# Patient Record
Sex: MALE | Race: WHITE | NOT HISPANIC OR LATINO | Employment: OTHER | URBAN - METROPOLITAN AREA
[De-identification: names, ages, dates, MRNs, and addresses within clinical notes are randomized per-mention and may not be internally consistent; named-entity substitution may affect disease eponyms.]

---

## 2023-08-05 ENCOUNTER — HOSPITAL ENCOUNTER (INPATIENT)
Facility: HOSPITAL | Age: 78
LOS: 2 days | Discharge: HOME | DRG: 193 | End: 2023-08-09
Attending: EMERGENCY MEDICINE | Admitting: INTERNAL MEDICINE
Payer: MEDICARE

## 2023-08-05 DIAGNOSIS — R07.9 CHEST PAIN: ICD-10-CM

## 2023-08-05 DIAGNOSIS — R42 DIZZINESS: ICD-10-CM

## 2023-08-05 DIAGNOSIS — J96.01 ACUTE HYPOXEMIC RESPIRATORY FAILURE: ICD-10-CM

## 2023-08-05 DIAGNOSIS — J18.9 PNEUMONIA OF LEFT LOWER LOBE DUE TO INFECTIOUS ORGANISM: Primary | ICD-10-CM

## 2023-08-05 PROBLEM — I10 HTN (HYPERTENSION): Status: ACTIVE | Noted: 2023-08-05

## 2023-08-05 PROBLEM — R74.01 TRANSAMINITIS: Status: ACTIVE | Noted: 2023-08-05

## 2023-08-05 PROBLEM — C61 PROSTATE CANCER: Status: ACTIVE | Noted: 2018-10-03

## 2023-08-05 PROBLEM — E78.2 MIXED HYPERLIPIDEMIA: Status: ACTIVE | Noted: 2023-08-05

## 2023-08-05 LAB
ALBUMIN SERPL BCP-MCNC: 2.9 G/DL (ref 3.5–5.2)
ALLENS TEST: ABNORMAL
ALP SERPL-CCNC: 78 U/L (ref 55–135)
ALT SERPL W/O P-5'-P-CCNC: 265 U/L (ref 10–44)
ANION GAP SERPL CALC-SCNC: 11 MMOL/L (ref 8–16)
AST SERPL-CCNC: 214 U/L (ref 10–40)
BACTERIA #/AREA URNS HPF: NORMAL /HPF
BASOPHILS # BLD AUTO: 0.03 K/UL (ref 0–0.2)
BASOPHILS NFR BLD: 0.2 % (ref 0–1.9)
BILIRUB SERPL-MCNC: 0.6 MG/DL (ref 0.1–1)
BILIRUB UR QL STRIP: NEGATIVE
BNP SERPL-MCNC: 41 PG/ML (ref 0–99)
BUN SERPL-MCNC: 25 MG/DL (ref 8–23)
CALCIUM SERPL-MCNC: 8.8 MG/DL (ref 8.7–10.5)
CHLORIDE SERPL-SCNC: 100 MMOL/L (ref 95–110)
CLARITY UR: CLEAR
CO2 SERPL-SCNC: 23 MMOL/L (ref 23–29)
COLOR UR: YELLOW
CREAT SERPL-MCNC: 1.3 MG/DL (ref 0.5–1.4)
DELSYS: ABNORMAL
DIFFERENTIAL METHOD: ABNORMAL
EOSINOPHIL # BLD AUTO: 0 K/UL (ref 0–0.5)
EOSINOPHIL NFR BLD: 0.1 % (ref 0–8)
ERYTHROCYTE [DISTWIDTH] IN BLOOD BY AUTOMATED COUNT: 12.9 % (ref 11.5–14.5)
EST. GFR  (NO RACE VARIABLE): 57 ML/MIN/1.73 M^2
GLUCOSE SERPL-MCNC: 128 MG/DL (ref 70–110)
GLUCOSE UR QL STRIP: NEGATIVE
HAV IGM SERPL QL IA: NORMAL
HBV CORE IGM SERPL QL IA: NORMAL
HBV SURFACE AG SERPL QL IA: NORMAL
HCO3 UR-SCNC: 20.3 MMOL/L (ref 24–28)
HCT VFR BLD AUTO: 38 % (ref 40–54)
HCV AB SERPL QL IA: NORMAL
HGB BLD-MCNC: 13.1 G/DL (ref 14–18)
HGB UR QL STRIP: ABNORMAL
HYALINE CASTS #/AREA URNS LPF: 0 /LPF
IMM GRANULOCYTES # BLD AUTO: 0.19 K/UL (ref 0–0.04)
IMM GRANULOCYTES NFR BLD AUTO: 1.3 % (ref 0–0.5)
INFLUENZA A, MOLECULAR: NEGATIVE
INFLUENZA B, MOLECULAR: NEGATIVE
KETONES UR QL STRIP: NEGATIVE
LACTATE SERPL-SCNC: 1.6 MMOL/L (ref 0.5–2.2)
LEUKOCYTE ESTERASE UR QL STRIP: NEGATIVE
LYMPHOCYTES # BLD AUTO: 0.6 K/UL (ref 1–4.8)
LYMPHOCYTES NFR BLD: 3.9 % (ref 18–48)
MCH RBC QN AUTO: 28.9 PG (ref 27–31)
MCHC RBC AUTO-ENTMCNC: 34.5 G/DL (ref 32–36)
MCV RBC AUTO: 84 FL (ref 82–98)
MICROSCOPIC COMMENT: NORMAL
MONOCYTES # BLD AUTO: 1.5 K/UL (ref 0.3–1)
MONOCYTES NFR BLD: 9.9 % (ref 4–15)
NEUTROPHILS # BLD AUTO: 12.9 K/UL (ref 1.8–7.7)
NEUTROPHILS NFR BLD: 84.6 % (ref 38–73)
NITRITE UR QL STRIP: NEGATIVE
NRBC BLD-RTO: 0 /100 WBC
PCO2 BLDA: 23.7 MMHG (ref 35–45)
PH SMN: 7.54 [PH] (ref 7.35–7.45)
PH UR STRIP: 6 [PH] (ref 5–8)
PLATELET # BLD AUTO: 198 K/UL (ref 150–450)
PMV BLD AUTO: 9.8 FL (ref 9.2–12.9)
PO2 BLDA: 51 MMHG (ref 80–100)
POC BE: -2 MMOL/L
POC SATURATED O2: 91 % (ref 95–100)
POTASSIUM SERPL-SCNC: 4 MMOL/L (ref 3.5–5.1)
PROCALCITONIN SERPL IA-MCNC: 0.22 NG/ML
PROT SERPL-MCNC: 7.2 G/DL (ref 6–8.4)
PROT UR QL STRIP: ABNORMAL
RBC # BLD AUTO: 4.53 M/UL (ref 4.6–6.2)
RBC #/AREA URNS HPF: 0 /HPF (ref 0–4)
SAMPLE: ABNORMAL
SARS-COV-2 RDRP RESP QL NAA+PROBE: NEGATIVE
SITE: ABNORMAL
SODIUM SERPL-SCNC: 134 MMOL/L (ref 136–145)
SP GR UR STRIP: 1.03 (ref 1–1.03)
SPECIMEN SOURCE: NORMAL
TROPONIN I SERPL DL<=0.01 NG/ML-MCNC: 0.01 NG/ML (ref 0–0.03)
TROPONIN I SERPL DL<=0.01 NG/ML-MCNC: 0.01 NG/ML (ref 0–0.03)
URN SPEC COLLECT METH UR: ABNORMAL
UROBILINOGEN UR STRIP-ACNC: ABNORMAL EU/DL
WBC # BLD AUTO: 15.2 K/UL (ref 3.9–12.7)
WBC #/AREA URNS HPF: 2 /HPF (ref 0–5)

## 2023-08-05 PROCEDURE — 25000003 PHARM REV CODE 250: Performed by: EMERGENCY MEDICINE

## 2023-08-05 PROCEDURE — 63600175 PHARM REV CODE 636 W HCPCS: Performed by: NURSE PRACTITIONER

## 2023-08-05 PROCEDURE — 36600 WITHDRAWAL OF ARTERIAL BLOOD: CPT

## 2023-08-05 PROCEDURE — 93005 ELECTROCARDIOGRAM TRACING: CPT

## 2023-08-05 PROCEDURE — 80074 ACUTE HEPATITIS PANEL: CPT | Performed by: INTERNAL MEDICINE

## 2023-08-05 PROCEDURE — 99900035 HC TECH TIME PER 15 MIN (STAT)

## 2023-08-05 PROCEDURE — 84484 ASSAY OF TROPONIN QUANT: CPT | Performed by: EMERGENCY MEDICINE

## 2023-08-05 PROCEDURE — 27000221 HC OXYGEN, UP TO 24 HOURS

## 2023-08-05 PROCEDURE — 97161 PT EVAL LOW COMPLEX 20 MIN: CPT

## 2023-08-05 PROCEDURE — 25500020 PHARM REV CODE 255: Performed by: EMERGENCY MEDICINE

## 2023-08-05 PROCEDURE — 84145 PROCALCITONIN (PCT): CPT | Performed by: INTERNAL MEDICINE

## 2023-08-05 PROCEDURE — 82803 BLOOD GASES ANY COMBINATION: CPT

## 2023-08-05 PROCEDURE — 83880 ASSAY OF NATRIURETIC PEPTIDE: CPT | Performed by: EMERGENCY MEDICINE

## 2023-08-05 PROCEDURE — 97166 OT EVAL MOD COMPLEX 45 MIN: CPT

## 2023-08-05 PROCEDURE — 97530 THERAPEUTIC ACTIVITIES: CPT

## 2023-08-05 PROCEDURE — 87502 INFLUENZA DNA AMP PROBE: CPT | Performed by: EMERGENCY MEDICINE

## 2023-08-05 PROCEDURE — 25000003 PHARM REV CODE 250: Performed by: NURSE PRACTITIONER

## 2023-08-05 PROCEDURE — 93010 ELECTROCARDIOGRAM REPORT: CPT | Mod: ,,, | Performed by: INTERNAL MEDICINE

## 2023-08-05 PROCEDURE — 87040 BLOOD CULTURE FOR BACTERIA: CPT | Performed by: EMERGENCY MEDICINE

## 2023-08-05 PROCEDURE — 96366 THER/PROPH/DIAG IV INF ADDON: CPT

## 2023-08-05 PROCEDURE — 96372 THER/PROPH/DIAG INJ SC/IM: CPT | Performed by: NURSE PRACTITIONER

## 2023-08-05 PROCEDURE — U0002 COVID-19 LAB TEST NON-CDC: HCPCS | Performed by: EMERGENCY MEDICINE

## 2023-08-05 PROCEDURE — G0378 HOSPITAL OBSERVATION PER HR: HCPCS

## 2023-08-05 PROCEDURE — 96361 HYDRATE IV INFUSION ADD-ON: CPT

## 2023-08-05 PROCEDURE — 94761 N-INVAS EAR/PLS OXIMETRY MLT: CPT

## 2023-08-05 PROCEDURE — 93010 EKG 12-LEAD: ICD-10-PCS | Mod: ,,, | Performed by: INTERNAL MEDICINE

## 2023-08-05 PROCEDURE — 63600175 PHARM REV CODE 636 W HCPCS: Performed by: EMERGENCY MEDICINE

## 2023-08-05 PROCEDURE — 85025 COMPLETE CBC W/AUTO DIFF WBC: CPT | Performed by: EMERGENCY MEDICINE

## 2023-08-05 PROCEDURE — 99285 EMERGENCY DEPT VISIT HI MDM: CPT | Mod: 25

## 2023-08-05 PROCEDURE — 80053 COMPREHEN METABOLIC PANEL: CPT | Performed by: EMERGENCY MEDICINE

## 2023-08-05 PROCEDURE — 83605 ASSAY OF LACTIC ACID: CPT | Performed by: EMERGENCY MEDICINE

## 2023-08-05 PROCEDURE — 81000 URINALYSIS NONAUTO W/SCOPE: CPT | Performed by: EMERGENCY MEDICINE

## 2023-08-05 PROCEDURE — 96365 THER/PROPH/DIAG IV INF INIT: CPT | Mod: 59

## 2023-08-05 RX ORDER — PREDNISONE 20 MG/1
40 TABLET ORAL DAILY
Status: ON HOLD | COMMUNITY
Start: 2023-08-03 | End: 2023-08-09 | Stop reason: HOSPADM

## 2023-08-05 RX ORDER — IBUPROFEN 200 MG
24 TABLET ORAL
Status: DISCONTINUED | OUTPATIENT
Start: 2023-08-05 | End: 2023-08-09 | Stop reason: HOSPADM

## 2023-08-05 RX ORDER — ONDANSETRON 2 MG/ML
4 INJECTION INTRAMUSCULAR; INTRAVENOUS EVERY 8 HOURS PRN
Status: DISCONTINUED | OUTPATIENT
Start: 2023-08-05 | End: 2023-08-09 | Stop reason: HOSPADM

## 2023-08-05 RX ORDER — IBUPROFEN 200 MG
16 TABLET ORAL
Status: DISCONTINUED | OUTPATIENT
Start: 2023-08-05 | End: 2023-08-09 | Stop reason: HOSPADM

## 2023-08-05 RX ORDER — DEXBROMPHENIRAMINE MALEATE, PHENYLEPHRINE HYDROCHLORIDE 2; 7.5 MG/1; MG/1
1 TABLET ORAL 3 TIMES DAILY
COMMUNITY
Start: 2023-08-03

## 2023-08-05 RX ORDER — GLUCAGON 1 MG
1 KIT INJECTION
Status: DISCONTINUED | OUTPATIENT
Start: 2023-08-05 | End: 2023-08-09 | Stop reason: HOSPADM

## 2023-08-05 RX ORDER — ROSUVASTATIN CALCIUM 5 MG/1
5 TABLET, COATED ORAL DAILY
COMMUNITY
Start: 2023-06-12

## 2023-08-05 RX ORDER — ENOXAPARIN SODIUM 100 MG/ML
40 INJECTION SUBCUTANEOUS EVERY 24 HOURS
Status: DISCONTINUED | OUTPATIENT
Start: 2023-08-05 | End: 2023-08-09 | Stop reason: HOSPADM

## 2023-08-05 RX ORDER — IPRATROPIUM BROMIDE AND ALBUTEROL SULFATE 2.5; .5 MG/3ML; MG/3ML
3 SOLUTION RESPIRATORY (INHALATION) EVERY 4 HOURS PRN
Status: DISCONTINUED | OUTPATIENT
Start: 2023-08-05 | End: 2023-08-09 | Stop reason: HOSPADM

## 2023-08-05 RX ORDER — ACETAMINOPHEN 500 MG
1000 TABLET ORAL
Status: COMPLETED | OUTPATIENT
Start: 2023-08-05 | End: 2023-08-05

## 2023-08-05 RX ORDER — HYDROCODONE BITARTRATE AND ACETAMINOPHEN 5; 325 MG/1; MG/1
1 TABLET ORAL EVERY 6 HOURS PRN
Status: DISCONTINUED | OUTPATIENT
Start: 2023-08-05 | End: 2023-08-09 | Stop reason: HOSPADM

## 2023-08-05 RX ORDER — NAPROXEN SODIUM 220 MG/1
81 TABLET, FILM COATED ORAL DAILY
Status: DISCONTINUED | OUTPATIENT
Start: 2023-08-06 | End: 2023-08-09 | Stop reason: HOSPADM

## 2023-08-05 RX ORDER — AMOXICILLIN 875 MG/1
875 TABLET, FILM COATED ORAL EVERY 12 HOURS
Status: ON HOLD | COMMUNITY
Start: 2023-08-03 | End: 2023-08-09 | Stop reason: HOSPADM

## 2023-08-05 RX ORDER — ATORVASTATIN CALCIUM 10 MG/1
20 TABLET, FILM COATED ORAL NIGHTLY
Status: DISCONTINUED | OUTPATIENT
Start: 2023-08-05 | End: 2023-08-05

## 2023-08-05 RX ORDER — NALOXONE HCL 0.4 MG/ML
0.02 VIAL (ML) INJECTION
Status: DISCONTINUED | OUTPATIENT
Start: 2023-08-05 | End: 2023-08-09 | Stop reason: HOSPADM

## 2023-08-05 RX ORDER — MECLIZINE HYDROCHLORIDE 25 MG/1
25 TABLET ORAL 3 TIMES DAILY PRN
Status: DISCONTINUED | OUTPATIENT
Start: 2023-08-05 | End: 2023-08-09 | Stop reason: HOSPADM

## 2023-08-05 RX ORDER — NAPROXEN SODIUM 220 MG/1
81 TABLET, FILM COATED ORAL DAILY
COMMUNITY

## 2023-08-05 RX ORDER — SODIUM CHLORIDE, SODIUM LACTATE, POTASSIUM CHLORIDE, CALCIUM CHLORIDE 600; 310; 30; 20 MG/100ML; MG/100ML; MG/100ML; MG/100ML
INJECTION, SOLUTION INTRAVENOUS CONTINUOUS
Status: DISCONTINUED | OUTPATIENT
Start: 2023-08-05 | End: 2023-08-08

## 2023-08-05 RX ORDER — AMLODIPINE AND BENAZEPRIL HYDROCHLORIDE 10; 20 MG/1; MG/1
1 CAPSULE ORAL DAILY
COMMUNITY
Start: 2023-06-12

## 2023-08-05 RX ORDER — SODIUM CHLORIDE 0.9 % (FLUSH) 0.9 %
10 SYRINGE (ML) INJECTION EVERY 12 HOURS PRN
Status: DISCONTINUED | OUTPATIENT
Start: 2023-08-05 | End: 2023-08-09 | Stop reason: HOSPADM

## 2023-08-05 RX ORDER — ACETAMINOPHEN 325 MG/1
650 TABLET ORAL EVERY 4 HOURS PRN
Status: DISCONTINUED | OUTPATIENT
Start: 2023-08-05 | End: 2023-08-09 | Stop reason: HOSPADM

## 2023-08-05 RX ORDER — TALC
6 POWDER (GRAM) TOPICAL NIGHTLY PRN
Status: DISCONTINUED | OUTPATIENT
Start: 2023-08-05 | End: 2023-08-09 | Stop reason: HOSPADM

## 2023-08-05 RX ORDER — ACETAMINOPHEN 325 MG/1
650 TABLET ORAL EVERY 6 HOURS PRN
Status: DISCONTINUED | OUTPATIENT
Start: 2023-08-05 | End: 2023-08-09 | Stop reason: HOSPADM

## 2023-08-05 RX ADMIN — ACETAMINOPHEN 650 MG: 325 TABLET ORAL at 05:08

## 2023-08-05 RX ADMIN — SODIUM CHLORIDE, POTASSIUM CHLORIDE, SODIUM LACTATE AND CALCIUM CHLORIDE 1000 ML: 600; 310; 30; 20 INJECTION, SOLUTION INTRAVENOUS at 08:08

## 2023-08-05 RX ADMIN — ACETAMINOPHEN 1000 MG: 500 TABLET ORAL at 07:08

## 2023-08-05 RX ADMIN — SODIUM CHLORIDE 500 ML: 9 INJECTION, SOLUTION INTRAVENOUS at 04:08

## 2023-08-05 RX ADMIN — SODIUM CHLORIDE, POTASSIUM CHLORIDE, SODIUM LACTATE AND CALCIUM CHLORIDE: 600; 310; 30; 20 INJECTION, SOLUTION INTRAVENOUS at 12:08

## 2023-08-05 RX ADMIN — ENOXAPARIN SODIUM 40 MG: 40 INJECTION SUBCUTANEOUS at 04:08

## 2023-08-05 RX ADMIN — CEFTRIAXONE 1 G: 1 INJECTION, POWDER, FOR SOLUTION INTRAMUSCULAR; INTRAVENOUS at 09:08

## 2023-08-05 RX ADMIN — IOHEXOL 100 ML: 350 INJECTION, SOLUTION INTRAVENOUS at 07:08

## 2023-08-05 RX ADMIN — VANCOMYCIN HYDROCHLORIDE 2000 MG: 10 INJECTION, POWDER, LYOPHILIZED, FOR SOLUTION INTRAVENOUS at 09:08

## 2023-08-05 NOTE — H&P
[facility] - Emergency Dept.  [facility] Medicine  History & Physical    Patient Name: Shelby Nunez Jr.  MRN: [id number]  Patient Class: OP- Observation  Admission Date: 8/5/2023  Attending Physician: Carl Osborne,*   Primary Care Provider: Kassandra, Primary Doctor         Patient information was obtained from patient, past medical records and ER records.     Subjective:     Principal Problem:Acute hypoxemic respiratory failure    Chief Complaint:   Chief Complaint   Patient presents with    Altered Mental Status     Pt became confused tonight, family called EMS. Dx with vertigo and sinus infection yesterday. Unsteady on feet. Tachypneic        HPI: Mr. Enrique Nichols is a 77 y.o. male patient with a PMHx of a HTN, HLD, prostate cancer s/p prostatectomy who presents to the Emergency Department for evaluation of altered mental status and dizziness which started last night. Patient started feeling unwell on Wednesday night and had a fever aroudn 101 that night.  [facility] was diagnosed with vertigo and sinus infection, he was started on steroids and amoxicillin.  Last night patients wife stated that he had another fever and was confused and dizzy and fell while coming back from the bathroom, did not hit head or lose consciousness.  He has been taking IBP around the clock for his fevers.  He denies any CP, N/V/D.  In the ED, tmax 103.1, o2 sats 86 on room air, placed on 2 liters.  Lab work notable for WBC 15, , .  UA with no signs of infection.  FLU and COVID negative.  CT head negative for any acute intracranial processes.  CTA chest negative for PE, Showed Moderate size ground-glass and airspace infiltrate left lower lobe.  Patient will be admitted to observation for vertigo, pna, hypoxia.      Code status Full  Surrogate Decision Maker wife Tiffanie      History reviewed. No pertinent past medical history.    Past Surgical History:   Procedure Laterality Date     PROSTATECTOMY  2019       Review of patient's allergies indicates:  No Known Allergies    No current facility-administered medications on file prior to encounter.     Current Outpatient Medications on File Prior to Encounter   Medication Sig    ALAHIST PE 2-7.5 mg Tab Take 1 tablet by mouth 3 (three) times daily.    amlodipine-benazepril 10-20mg (LOTREL) 10-20 mg per capsule Take 1 capsule by mouth.    amoxicillin (AMOXIL) 875 MG tablet Take 875 mg by mouth every 12 (twelve) hours.    aspirin 81 MG Chew Take 81 mg by mouth once daily.    predniSONE (DELTASONE) 20 MG tablet Take 40 mg by mouth.    rosuvastatin (CRESTOR) 5 MG tablet Take 5 mg by mouth.     Family History    None       Tobacco Use    Smoking status: Former     Current packs/day: 0.00     Types: Cigarettes    Smokeless tobacco: Never   Substance and Sexual Activity    Alcohol use: Not Currently     Alcohol/week: 4.0 standard drinks of alcohol     Types: 4 Cans of beer per week    Drug use: Never    Sexual activity: Not on file     Review of Systems   Constitutional:  Positive for appetite change, fatigue and fever.   Neurological:  Positive for dizziness and weakness.   Psychiatric/Behavioral:  Positive for confusion.      Objective:     Vital Signs (Most Recent):  Temp: 100 °F (37.8 °C) (08/05/23 0909)  Pulse: 79 (08/05/23 0909)  Resp: (!) 22 (08/05/23 0909)  BP: (!) 122/54 (08/05/23 0909)  SpO2: 95 % (08/05/23 0909) Vital Signs (24h Range):  Temp:  [99.5 °F (37.5 °C)-103.1 °F (39.5 °C)] 100 °F (37.8 °C)  Pulse:  [79-95] 79  Resp:  [20-29] 22  SpO2:  [92 %-96 %] 95 %  BP: (122-159)/() 122/54     Weight: 103 kg (227 lb 1.2 oz)  Body mass index is 30.8 kg/m².     Physical Exam  Constitutional:       Appearance: He is ill-appearing.   Cardiovascular:      Rate and Rhythm: Normal rate and regular rhythm.      Pulses: Normal pulses.      Heart sounds: Normal heart sounds.   Pulmonary:      Effort: Pulmonary effort is normal.      Comments:  Diminished breath sounds all lobes, on 2 liters nasal cannula   Abdominal:      General: Bowel sounds are normal. There is no distension.      Palpations: Abdomen is soft.      Tenderness: There is no abdominal tenderness.   Musculoskeletal:         General: No swelling. Normal range of motion.   Skin:     General: Skin is warm and dry.   Neurological:      General: No focal deficit present.      Mental Status: He is alert and oriented to person, place, and time.        Significant Labs: All pertinent labs within the past 24 hours have been reviewed.    Significant Imaging: I have reviewed all pertinent imaging results/findings within the past 24 hours.    Assessment/Plan:     * Acute hypoxemic respiratory failure  Patient with Hypoxic Respiratory failure which is Acute.  he is not on home oxygen. Supplemental oxygen was provided and noted-    Signs/symptoms of respiratory failure include- tachypnea. Contributing diagnoses includes - Pneumonia Labs and images were reviewed. Patient Has recent ABG, which has been reviewed. Will treat underlying causes and adjust management of respiratory failure as follows    Ct chest showed: Moderate size ground-glass and airspace infiltrate left lower lobe  Continue iv abx  Wean o2 as tolerated  Duo nebs as needed    Vertigo  Pt/ot eval  meclizine as needed  Fall precautions        Mixed hyperlipidemia  Holding statin d/t elevated ALT/AST      HTN (hypertension)  bp low/normotensive, holding home amlodipine/benazpril at this time      Transaminitis  Likely d/t recent use of ibproufen  Check hep panel  Trend, if not improving consider ab ultrasound        VTE Risk Mitigation (From admission, onward)         Ordered     enoxaparin injection 40 mg  Daily         08/05/23 0920     IP VTE HIGH RISK PATIENT  Once         08/05/23 0920     Place sequential compression device  Until discontinued         08/05/23 0920                     On 08/05/2023, patient should be placed in hospital  observation services under my care in collaboration with DR. Choe.      Manasa Dela Cruz NP  Department of Hospital Medicine  [facility] - Emergency Dept.

## 2023-08-05 NOTE — ASSESSMENT & PLAN NOTE
Likely d/t recent use of ibproufen  Check hep panel  Trend, if not improving consider ab ultrasound

## 2023-08-05 NOTE — PHARMACY MED REC
"Admission Medication History     The home medication history was taken by Nelson Loving.    You may go to "Admission" then "Reconcile Home Medications" tabs to review and/or act upon these items.     The home medication list has been updated by the Pharmacy department.   Please read ALL comments highlighted in yellow.   Please address this information as you see fit.    Feel free to contact us if you have any questions or require assistance.      Medications listed below were obtained from: Analytic software- [employer] and Medical records  (Not in a hospital admission)        Nelson Loving  [id number]    Current Outpatient Medications on File Prior to Encounter   Medication Sig Dispense Refill Last Dose    ALAHIST PE 2-7.5 mg Tab Take 1 tablet by mouth 3 (three) times daily.   8/4/2023    amlodipine-benazepril 10-20mg (LOTREL) 10-20 mg per capsule Take 1 capsule by mouth.   8/4/2023    amoxicillin (AMOXIL) 875 MG tablet Take 875 mg by mouth every 12 (twelve) hours.   8/4/2023    aspirin 81 MG Chew Take 81 mg by mouth once daily.   8/4/2023    predniSONE (DELTASONE) 20 MG tablet Take 40 mg by mouth.   8/4/2023    rosuvastatin (CRESTOR) 5 MG tablet Take 5 mg by mouth.   8/4/2023                         .          "

## 2023-08-05 NOTE — ASSESSMENT & PLAN NOTE
Patient with Hypoxic Respiratory failure which is Acute.  he is not on home oxygen. Supplemental oxygen was provided and noted-    Signs/symptoms of respiratory failure include- tachypnea. Contributing diagnoses includes - Pneumonia Labs and images were reviewed. Patient Has recent ABG, which has been reviewed. Will treat underlying causes and adjust management of respiratory failure as follows    Ct chest showed: Moderate size ground-glass and airspace infiltrate left lower lobe  Continue iv abx  Wean o2 as tolerated  Duo nebs as needed

## 2023-08-05 NOTE — ED PROVIDER NOTES
SCRIBE #1 NOTE: I, Delldemian Lao, am scribing for, and in the presence of, Artur Luis Jr., MD. I have scribed the HPI, YANDEL, PEx.     SCRIBE #2 NOTE: I, My Presley, am scribing for, and in the presence of,  Fahad Boyd MD. I have scribed the remaining portions of the note not scribed by Scribe #1.      History     Chief Complaint   Patient presents with    Altered Mental Status     Pt became confused tonight, family called EMS. Dx with vertigo and sinus infection yesterday. Unsteady on feet. Tachypneic     Review of patient's allergies indicates:  No Known Allergies      History of Present Illness     HPI    8/5/2023, 4:56 AM  History obtained from the patient and wife      History of Present Illness: Shelby Nunez Jr. is a 77 y.o. male patient with a PMHx of a prostate removal who presents to the Emergency Department for evaluation of altered mental status which onset last night. Pt's wife states pt has had a fever (Tmax 102.2 F) and fell onto the ground after going to the bathroom. Pt has been taking ibuprofen to alleviate the fever. Symptoms are constant and moderate in severity. No mitigating or exacerbating factors reported. Associated sxs include nausea, hiccups, dizziness, and tachypnea. Patient denies any V/D, chest pain, syncope, cough, congestion, and all other sxs at this time. No prior Tx. Pt was dx with vertigo and a sinus infection yesterday. No further complaints or concerns at this time.       Arrival mode: Ambulance Service    PCP: Kassandra, Primary Doctor        Past Medical History:  History reviewed. No pertinent past medical history.    Past Surgical History:  Past Surgical History:   Procedure Laterality Date    PROSTATECTOMY  2019         Family History:  History reviewed. No pertinent family history.    Social History:  Social History     Tobacco Use    Smoking status: Former     Current packs/day: 0.00     Types: Cigarettes    Smokeless tobacco: Never   Substance and Sexual Activity     Alcohol use: Not Currently     Alcohol/week: 4.0 standard drinks of alcohol     Types: 4 Cans of beer per week    Drug use: Never    Sexual activity: Not on file        Review of Systems     Review of Systems   Constitutional:  Positive for fever.   HENT:  Negative for congestion and sore throat.    Respiratory:  Negative for cough and shortness of breath.         (+) Hiccups  (+) Tachypnea   Cardiovascular:  Negative for chest pain.   Gastrointestinal:  Positive for nausea. Negative for diarrhea and vomiting.   Genitourinary:  Negative for dysuria.   Musculoskeletal:  Negative for back pain.   Skin:  Negative for rash.   Neurological:  Positive for dizziness. Negative for syncope and weakness.   Hematological:  Does not bruise/bleed easily.   Psychiatric/Behavioral:          (+) Altered Mental Status   All other systems reviewed and are negative.       Physical Exam     Initial Vitals [08/05/23 0233]   BP Pulse Resp Temp SpO2   (!) 150/72 86 20 99.5 °F (37.5 °C) 95 %      MAP       --          Physical Exam  Nursing Notes and Vital Signs Reviewed.  Constitutional: Patient is in no acute distress. Well-developed and well-nourished.  Head: Atraumatic. Normocephalic.  Eyes: PERRL. EOM intact. Conjunctivae are not pale. No scleral icterus.  ENT: Mucous membranes are moist. Oropharynx is clear and symmetric.    Neck: Supple. Full ROM. No lymphadenopathy.  Cardiovascular: Regular rate. Regular rhythm. No murmurs, rubs, or gallops. Distal pulses are 2+ and symmetric.  Pulmonary/Chest: No respiratory distress. Clear to auscultation bilaterally. No wheezing or rales.  Abdominal: Soft and non-distended.  There is no tenderness.  No rebound, guarding, or rigidity. Good bowel sounds.  Genitourinary: No CVA tenderness  Musculoskeletal: Moves all extremities. No obvious deformities. No edema. No calf tenderness.  Skin: Warm and dry.  Neurological:  Alert, awake, and appropriate.  Normal speech.  No acute focal neurological  deficits are appreciated.  Psychiatric: Normal affect. Good eye contact. Appropriate in content.     ED Course   Critical Care    Date/Time: 8/5/2023 8:16 AM    Performed by: Fahad Boyd MD  Authorized by: Fahad Boyd MD  Direct patient critical care time: 7 minutes  Additional history critical care time: 6 minutes  Ordering / reviewing critical care time: 9 minutes  Documentation critical care time: 3 minutes  Consulting other physicians critical care time: 5 minutes  Consult with family critical care time: 4 minutes  Other critical care time: 3 minutes  Total critical care time (exclusive of procedural time) : 37 minutes  Critical care time was exclusive of separately billable procedures and treating other patients and teaching time.  Critical care was necessary to treat or prevent imminent or life-threatening deterioration of the following conditions: sepsis.  Critical care was time spent personally by me on the following activities: blood draw for specimens, development of treatment plan with patient or surrogate, discussions with consultants, interpretation of cardiac output measurements, evaluation of patient's response to treatment, obtaining history from patient or surrogate, examination of patient, pulse oximetry, re-evaluation of patient's condition, review of old charts, ordering and review of radiographic studies, ordering and review of laboratory studies and ordering and performing treatments and interventions.  Subsequent provider of critical care: I assumed direction of critical care for this patient from another provider of my specialty.        ED Vital Signs:  Vitals:    08/05/23 0615 08/05/23 0628 08/05/23 0645 08/05/23 0700   BP:   (!) 159/139 124/60   Pulse: 91  87 87   Resp: (!) 28  (!) 27 (!) 24   Temp:  (!) 103.1 °F (39.5 °C)     TempSrc:  Oral     SpO2: (!) 94%  (!) 94% 96%   Weight:       Height:        08/05/23 0721 08/05/23 0818 08/05/23 0909 08/05/23 1002   BP:   (!) 122/54 (!)  106/55   Pulse:  83 79 75   Resp:  (!) 21 (!) 22 20   Temp:   100 °F (37.8 °C)    TempSrc:   Oral    SpO2:  96% 95% 95%   Weight:       Height: 6' (1.829 m)       08/05/23 1102 08/05/23 1203 08/05/23 1321 08/05/23 1400   BP: 110/60 (!) 114/53  (!) 170/76   Pulse: 79 70 104    Resp: 20 20 (!) 22    Temp:  98.9 °F (37.2 °C) 99.3 °F (37.4 °C)    TempSrc:   Oral    SpO2: 96% 96% (!) 93%    Weight:       Height:        08/05/23 1710 08/05/23 1736 08/05/23 1751   BP:  (!) 133/55    Pulse: 78 89    Resp:  18    Temp:  (!) 103 °F (39.4 °C) (!) 103 °F (39.4 °C)   TempSrc:  Oral    SpO2:  (!) 92%    Weight:      Height:          Abnormal Lab Results:  Labs Reviewed   CBC W/ AUTO DIFFERENTIAL - Abnormal; Notable for the following components:       Result Value    WBC 15.20 (*)     RBC 4.53 (*)     Hemoglobin 13.1 (*)     Hematocrit 38.0 (*)     Immature Granulocytes 1.3 (*)     Gran # (ANC) 12.9 (*)     Immature Grans (Abs) 0.19 (*)     Lymph # 0.6 (*)     Mono # 1.5 (*)     Gran % 84.6 (*)     Lymph % 3.9 (*)     All other components within normal limits   COMPREHENSIVE METABOLIC PANEL - Abnormal; Notable for the following components:    Sodium 134 (*)     Glucose 128 (*)     BUN 25 (*)     Albumin 2.9 (*)      (*)      (*)     eGFR 57 (*)     All other components within normal limits   URINALYSIS, REFLEX TO URINE CULTURE - Abnormal; Notable for the following components:    Protein, UA 1+ (*)     Occult Blood UA 1+ (*)     Urobilinogen, UA 2.0-3.0 (*)     All other components within normal limits    Narrative:     Specimen Source->Urine   ISTAT PROCEDURE - Abnormal; Notable for the following components:    POC PH 7.540 (*)     POC PCO2 23.7 (*)     POC PO2 51 (*)     POC HCO3 20.3 (*)     POC SATURATED O2 91 (*)     All other components within normal limits   INFLUENZA A & B BY MOLECULAR   TROPONIN I   TROPONIN I   B-TYPE NATRIURETIC PEPTIDE   SARS-COV-2 RNA AMPLIFICATION, QUAL   URINALYSIS MICROSCOPIC     Narrative:     Specimen Source->Urine   LACTIC ACID, PLASMA   PROCALCITONIN        All Lab Results:  Results for orders placed or performed during the hospital encounter of 08/05/23   Influenza A & B by Molecular    Specimen: Nasopharyngeal Swab   Result Value Ref Range    Influenza A, Molecular Negative Negative    Influenza B, Molecular Negative Negative    Flu A & B Source Nasal swab    CBC auto differential   Result Value Ref Range    WBC 15.20 (H) 3.90 - 12.70 K/uL    RBC 4.53 (L) 4.60 - 6.20 M/uL    Hemoglobin 13.1 (L) 14.0 - 18.0 g/dL    Hematocrit 38.0 (L) 40.0 - 54.0 %    MCV 84 82 - 98 fL    MCH 28.9 27.0 - 31.0 pg    MCHC 34.5 32.0 - 36.0 g/dL    RDW 12.9 11.5 - 14.5 %    Platelets 198 150 - 450 K/uL    MPV 9.8 9.2 - 12.9 fL    Immature Granulocytes 1.3 (H) 0.0 - 0.5 %    Gran # (ANC) 12.9 (H) 1.8 - 7.7 K/uL    Immature Grans (Abs) 0.19 (H) 0.00 - 0.04 K/uL    Lymph # 0.6 (L) 1.0 - 4.8 K/uL    Mono # 1.5 (H) 0.3 - 1.0 K/uL    Eos # 0.0 0.0 - 0.5 K/uL    Baso # 0.03 0.00 - 0.20 K/uL    nRBC 0 0 /100 WBC    Gran % 84.6 (H) 38.0 - 73.0 %    Lymph % 3.9 (L) 18.0 - 48.0 %    Mono % 9.9 4.0 - 15.0 %    Eosinophil % 0.1 0.0 - 8.0 %    Basophil % 0.2 0.0 - 1.9 %    Differential Method Automated    Comprehensive metabolic panel   Result Value Ref Range    Sodium 134 (L) 136 - 145 mmol/L    Potassium 4.0 3.5 - 5.1 mmol/L    Chloride 100 95 - 110 mmol/L    CO2 23 23 - 29 mmol/L    Glucose 128 (H) 70 - 110 mg/dL    BUN 25 (H) 8 - 23 mg/dL    Creatinine 1.3 0.5 - 1.4 mg/dL    Calcium 8.8 8.7 - 10.5 mg/dL    Total Protein 7.2 6.0 - 8.4 g/dL    Albumin 2.9 (L) 3.5 - 5.2 g/dL    Total Bilirubin 0.6 0.1 - 1.0 mg/dL    Alkaline Phosphatase 78 55 - 135 U/L     (H) 10 - 40 U/L     (H) 10 - 44 U/L    eGFR 57 (A) >60 mL/min/1.73 m^2    Anion Gap 11 8 - 16 mmol/L   Troponin I #1   Result Value Ref Range    Troponin I 0.012 0.000 - 0.026 ng/mL   Troponin I #2   Result Value Ref Range    Troponin I 0.010 0.000 -  0.026 ng/mL   BNP   Result Value Ref Range    BNP 41 0 - 99 pg/mL   COVID-19 Rapid Screening   Result Value Ref Range    SARS-CoV-2 RNA, Amplification, Qual Negative Negative   Urinalysis, Reflex to Urine Culture Urine, Catheterized    Specimen: Urine   Result Value Ref Range    Specimen UA Urine, Catheterized     Color, UA Yellow Yellow, Straw, Lucía    Appearance, UA Clear Clear    pH, UA 6.0 5.0 - 8.0    Specific Gravity, UA 1.030 1.005 - 1.030    Protein, UA 1+ (A) Negative    Glucose, UA Negative Negative    Ketones, UA Negative Negative    Bilirubin (UA) Negative Negative    Occult Blood UA 1+ (A) Negative    Nitrite, UA Negative Negative    Urobilinogen, UA 2.0-3.0 (A) <2.0 EU/dL    Leukocytes, UA Negative Negative   Urinalysis Microscopic   Result Value Ref Range    RBC, UA 0 0 - 4 /hpf    WBC, UA 2 0 - 5 /hpf    Bacteria Rare None-Occ /hpf    Hyaline Casts, UA 0 0-1/lpf /lpf    Microscopic Comment SEE COMMENT    Lactic acid, plasma   Result Value Ref Range    Lactate (Lactic Acid) 1.6 0.5 - 2.2 mmol/L   Procalcitonin   Result Value Ref Range    Procalcitonin 0.22 <0.25 ng/mL   ISTAT PROCEDURE   Result Value Ref Range    POC PH 7.540 (H) 7.35 - 7.45    POC PCO2 23.7 (LL) 35 - 45 mmHg    POC PO2 51 (LL) 80 - 100 mmHg    POC HCO3 20.3 (L) 24 - 28 mmol/L    POC BE -2 -2 to 2 mmol/L    POC SATURATED O2 91 (L) 95 - 100 %    Sample ARTERIAL     Site RR     Allens Test Pass     DelSys Room Air         Imaging Results:  Imaging Results              CTA Chest Non-Coronary (PE Studies) (Final result)  Result time 08/05/23 08:21:25      Final result by Tony Zamudio MD (08/05/23 08:21:25)                   Impression:      Moderate size ground-glass and airspace infiltrate left lower lobe.    Negative for pulmonary embolism.  Negative for aortic dissection.    Mildly prominent nonspecific mediastinal lymph nodes as detailed above.      Electronically signed by: Tony  Lizz  Date:    08/05/2023  Time:    08:21               Narrative:    EXAMINATION:  CTA CHEST NON CORONARY (PE STUDIES)    CLINICAL HISTORY:  Pulmonary embolism (PE) suspected, high prob;    TECHNIQUE:  Low dose axial images, sagittal and coronal reformations were obtained from the thoracic inlet to the lung bases following administration of intravenous contrast.  Contrast timing was optimized to evaluate the pulmonary arteries.  3D reformatted images were performed, reviewed and archived.    COMPARISON:  None    FINDINGS:  Pulmonary vasculature normal.  Negative for pulmonary embolism.    Heart normal in size with severe coronary artery calcification.  Aorta normal in caliber with mild atherosclerotic calcification.  Negative for dissection or aneurysm.  Mildly prominent mediastinal lymph nodes.  Largest is an aortopulmonary window lymph node measuring 1.8 by 1.4 cm.  No axillary adenopathy.  No adenopathy inferior neck.    Mild dependent atelectasis right lung.  Moderate size ground-glass and airspace infiltrate of the left lower lobe.  Left upper lobe clear.    Visualized upper abdomen unremarkable.  Chest wall soft tissues normal.    The visualized upper abdomen demonstrates no abnormalities.    Degenerative changes of the spine.  No suspicious or acute bony abnormality.                                       CT Head Without Contrast (Final result)  Result time 08/05/23 07:14:17      Final result by Tony Zamudio MD (08/05/23 07:14:17)                   Impression:      No acute findings.    Mild chronic ischemic changes deep white matter.      Electronically signed by: Tony Zamudio  Date:    08/05/2023  Time:    07:14               Narrative:    EXAMINATION:  CT HEAD WITHOUT CONTRAST    CLINICAL HISTORY:  Dizziness, persistent/recurrent, cardiac or vascular cause suspected;.    TECHNIQUE:  Low dose axial images were obtained through the head.  Coronal and sagittal reformations were also  performed. Contrast was not administered.    COMPARISON:  None.    FINDINGS:  Midline structures are intact. Ventricles are of normal size and configuration. Mild periventricular low-density changes compatible with mild chronic ischemic microvascular disease.    No intracranial hemorrhage,acute infarct, or suspicious intracranial lesion is identified.    Skull base and calvarium are normal. Incompletely visualized small polyp/retention cyst left maxillary antrum.  Remaining sinuses and mastoid air cells are clear.                                       X-Ray Chest AP Portable (Final result)  Result time 08/05/23 07:29:52      Final result by Tony Zamudio MD (08/05/23 07:29:52)                   Impression:      Discoid atelectasis left lung base.  Otherwise no acute findings.    No significant change since 10/03/2005.      Electronically signed by: Tony Zamudio  Date:    08/05/2023  Time:    07:29               Narrative:    EXAMINATION:  XR CHEST AP PORTABLE    CLINICAL HISTORY:  dizziness;    TECHNIQUE:  Chest x-ray, 1 views    COMPARISON:  10/03/2005    FINDINGS:  Atelectasis at the left lung base.  Otherwise lungs are clear.  Heart size within normal limits.No significant bony findings.                                       The EKG was ordered, reviewed, and independently interpreted by the ED provider.  Interpretation time: 4:58  Rate: 93  BPM  Rhythm: normal sinus rhythm with sinus arrhythmia   Interpretation: Right bundle branch block. No STEMI.           The Emergency Provider reviewed the vital signs and test results, which are outlined above.     ED Discussion     6:00 AM: Dr. Luis transfers care of patient to Dr. Boyd pending results.    8:51 AM: Discussed case with Manasa Dela Cruz NP. Dr. Carl Butts MD ([facility]) agrees with current care and management of pt and accepts admission.   Admitting Service: Hospital Medicine  Admitting Physician: Dr. Henry  Daija Butts  Admit to: Obs med surg     8:51 AM: Re-evaluated pt. I have discussed test results, shared treatment plan, and the need for admission with patient and family at bedside. Pt and family express understanding at this time and agree with all information. All questions answered. Pt and family have no further questions or concerns at this time. Pt is ready for admit.     ED Course as of 08/05/23 1758   Sat Aug 05, 2023   0710 Re-evaluated patient. 3d of lightheadedness, almost passing out, loosing his balace after standing. Y/d at , had fluid behind a TM, started Amox + Prednisone. This AM, was confused, thought his urine was blue, and was too weak to get back into bed. Here in ED, has been noted to be hypoxic, mild confusion per wife at bedside. Elevated WBC. pH elevated, possible over ventilation due to fever and hypoxia. CXR without kaylen opacity. CT pending, Abx started, BCx and Lactate ordered. Family would like admission to hospital.  [BA]      ED Course User Index  [BA] Fahad Boyd MD     Medical Decision Making:   Clinical Tests:   Lab Tests: Ordered and Reviewed  Radiological Study: Ordered and Reviewed  Medical Tests: Ordered and Reviewed           ED Medication(s):  Medications   aspirin chewable tablet 81 mg (has no administration in time range)   sodium chloride 0.9% flush 10 mL (has no administration in time range)   naloxone 0.4 mg/mL injection 0.02 mg (has no administration in time range)   glucose chewable tablet 16 g (has no administration in time range)   glucose chewable tablet 24 g (has no administration in time range)   glucagon (human recombinant) injection 1 mg (has no administration in time range)   enoxaparin injection 40 mg (40 mg Subcutaneous Given 8/5/23 1428)   acetaminophen tablet 650 mg (has no administration in time range)   HYDROcodone-acetaminophen 5-325 mg per tablet 1 tablet (has no administration in time range)   ondansetron injection 4 mg (has no  administration in time range)   melatonin tablet 6 mg (has no administration in time range)   albuterol-ipratropium 2.5 mg-0.5 mg/3 mL nebulizer solution 3 mL (has no administration in time range)   meclizine tablet 25 mg (has no administration in time range)   cefTRIAXone (ROCEPHIN) 2 g in dextrose 5 % in water (D5W) 100 mL IVPB (MB+) (has no administration in time range)   cefTRIAXone (ROCEPHIN) 1 g in dextrose 5 % in water (D5W) 100 mL IVPB (MB+) (1 g Intravenous Not Given 8/5/23 1000)   lactated ringers infusion ( Intravenous Stopped 8/5/23 1302)   acetaminophen tablet 650 mg (650 mg Oral Given 8/5/23 1751)   sodium chloride 0.9% bolus 500 mL 500 mL (0 mLs Intravenous Stopped 8/5/23 0549)   acetaminophen tablet 1,000 mg (1,000 mg Oral Given 8/5/23 0747)   cefTRIAXone (ROCEPHIN) 1 g in dextrose 5 % in water (D5W) 100 mL IVPB (MB+) (0 g Intravenous Stopped 8/5/23 0933)   lactated ringers bolus 1,000 mL (0 mLs Intravenous Stopped 8/5/23 0903)   iohexoL (OMNIPAQUE 350) injection 100 mL (100 mLs Intravenous Given 8/5/23 0737)   vancomycin 2 g in dextrose 5 % 500 mL IVPB (0 mg Intravenous Stopped 8/5/23 1106)       Current Discharge Medication List                  Scribe Attestation:   Scribe #1: I performed the above scribed service and the documentation accurately describes the services I performed. I attest to the accuracy of the note.     Attending:   Physician Attestation Statement for Scribe #1: I, Artur Luis Jr., MD, personally performed the services described in this documentation, as scribed by Dell Lao, in my presence, and it is both accurate and complete.       Scribe Attestation:   Scribe #2: I performed the above scribed service and the documentation accurately describes the services I performed. I attest to the accuracy of the note.    Attending Attestation:           Physician Attestation for Scribe:    Physician Attestation Statement for Scribe #2: I, Fahad Boyd MD, reviewed  documentation, as scribed by My Presley in my presence, and it is both accurate and complete. I also acknowledge and confirm the content of the note done by Scribe #1.           Clinical Impression       ICD-10-CM ICD-9-CM   1. Pneumonia of left lower lobe due to infectious organism  J18.9 486   2. Dizziness  R42 780.4   3. Acute hypoxemic respiratory failure  J96.01 518.81   4. Chest pain  R07.9 786.50       Disposition:   Disposition: Placed in Observation  Condition: Serious         Fahad Boyd MD  08/05/23 9435

## 2023-08-05 NOTE — PT/OT/SLP EVAL
"Occupational Therapy   Evaluation and Discharge Note    Name: Shelby Nunez Jr.  MRN: [id number]  Admitting Diagnosis: Acute hypoxemic respiratory failure  Recent Surgery: * No surgery found *      Recommendations:     Discharge Recommendations:  (defer to PT)  Discharge Equipment Recommendations: none  Barriers to discharge:  None    Assessment:     Shelby Nunez Jr. is a 77 y.o. male with a medical diagnosis of Acute hypoxemic respiratory failure. At this time, patient with + vestibular screening limiting balance and baseline independence.    Plan:     During this hospitalization, patient does not require further acute OT services as PT with goals to address deficits.  Please re-consult if situation changes.    Plan of Care Reviewed with: patient, spouse    Subjective     Chief Complaint: Reported "I am not dizzy at all now."  Patient/Family Comments/goals: increase independence    Occupational Profile:  Living Environment: Patient resides in a single story home with no steps to enter, with his wife.  Previous level of function: Patient was independent with ADLs and community distance ambulation prior to admission.  Roles and Routines: Patient was an active  and worked part time driving for [employer].  Equipment Used at home: none  Assistance upon Discharge: spouse    Pain/Comfort:  Pain Rating 1: 0/10    Objective:     Communicated with: NursePham, prior to session.  Patient found supine with peripheral IV, telemetry, PureWick, blood pressure cuff, pulse ox (continuous), oxygen upon OT entry to room.    General Precautions: Standard, fall  Orthopedic Precautions: N/A  Braces: N/A  Respiratory Status: Nasal cannula, flow 4 L/min     Occupational Performance:    Bed Mobility:    Patient completed Supine to Sit with stand by assistance  Patient completed Sit to Supine with stand by assistance    Functional Mobility/Transfers:  Patient completed Sit <> Stand Transfer with contact guard assistance  with  no assistive " device   Functional Mobility: Completed functional mobility x50ft with min HHA for generalized safety. Noted to have incontinent episode while standing. Poor awareness of occurrence.    Activities of Daily Living:  Grooming: setup .  Toileting: contact guard assistance used urinal while in standing    Cognitive/Visual Perceptual:  Cognitive/Psychosocial Skills:     -       Oriented to: Person, Place, Time, and Situation   -       Follows Commands/attention:Follows one-step commands    Physical Exam:  Balance:    -       sitting: WFL, static standing: fair +, dynamic standing: poor + to fair  Upper Extremity Range of Motion:     -       Right Upper Extremity: WFL  -       Left Upper Extremity: WFL  Upper Extremity Strength:    -       Right Upper Extremity: WFL  -       Left Upper Extremity: WFL   Strength:    -       Right Upper Extremity: WFL  -       Left Upper Extremity: WFL  Declines sensation deficits.    AMPAC 6 Click ADL:  AMPAC Total Score: 21    Treatment & Education:  Patient educated on role of OT in acute setting and benefits of OOB activity. Encouraged OOB throughout the course of hospitalization to decrease risk of hospital related debility. Patient stated understanding and in agreement with POC.    Patient left supine with all lines intact, call button in reach, nurse notified, and wife present    History:     History reviewed. No pertinent past medical history.      Past Surgical History:   Procedure Laterality Date    PROSTATECTOMY  2019       Time Tracking:     OT Date of Treatment: 08/05/23  OT Start Time: 1000  OT Stop Time: 1025  OT Total Time (min): 25 min    Billable Minutes:Evaluation 25 8/5/2023

## 2023-08-05 NOTE — PLAN OF CARE
OT elsy completed. Sup>sit SBA, sit>stand SBA, functional mobility x50ft with min HHA, step>pivot to bedside chair with CGA. Will defer vestibular intervention to PT at this time.

## 2023-08-05 NOTE — PLAN OF CARE
P.T. EVAL COMPLETE.  PT CURRENTLY REQUIRES SBA with supine<>sit and sit <> stand with HHA when ambulating 50 ft for safety. PT assessed gaze stab, VOR and tracking and he was slow in speed and losing the object at midline at times with diagonals. Client denied dizziness with head turns in bed and supine to sit but states both were an issue prior to the hospital episode this am.  P.T. RECOMMENDS out patient PT for further vestibular assessment AT D/C

## 2023-08-05 NOTE — Clinical Note
Diagnosis: Acute hypoxemic respiratory failure [[id number]]   Future Attending Provider: KELLY CAM [[id number]]   Admitting Provider:: KELLY CAM [[id number]]

## 2023-08-05 NOTE — PT/OT/SLP EVAL
Physical Therapy Evaluation    Patient Name:  Shelby Nunez Jr.   MRN:  [id number]    Recommendations:     Discharge Recommendations: outpatient PT   Discharge Equipment Recommendations: none   Barriers to discharge: None    Assessment:     Shelby Nunez Jr. is a 77 y.o. male admitted with a medical diagnosis of Acute hypoxemic respiratory failure.  He presents with the following impairments/functional limitations: weakness, gait instability, decreased ROM, impaired balance, visual deficits, decreased safety awareness, impaired functional mobility, decreased coordination (hypovestibular function noted) .    Rehab Prognosis: Good; patient would benefit from acute skilled PT services to address these deficits and reach maximum level of function.    Recent Surgery: * No surgery found *      Plan:     During this hospitalization, patient to be seen 3 x/week to address the identified rehab impairments via gait training, therapeutic activities, therapeutic exercises, neuromuscular re-education and progress toward the following goals:    Plan of Care Expires:  08/19/23    Subjective     Chief Complaint: dizziness and weak  Patient/Family Comments/goals: wants to return home with family  Pain/Comfort:  Pain Rating 1: 0/10    Patients cultural, spiritual, Buddhist conflicts given the current situation: yes    Living Environment:  Pt lives in one story home with spouse and is usually working and driving. Pt is I with home tasks.  Prior to admission, patients level of function was independent.  Equipment used at home: none.  DME owned (not currently used): rolling walker and single point cane.  Upon discharge, patient will have assistance from family.    Objective:     Communicated with veronica nurse prior to session.  Patient found supine with peripheral IV, PureWick, pulse ox (continuous), telemetry, oxygen  upon PT entry to room.    General Precautions: Standard, fall  Orthopedic Precautions:N/A   Braces: N/A  Respiratory  Status: Nasal cannula, flow 4 L/min    Exams:  RLE ROM: WFL  RLE Strength: WFL  LLE ROM: WFL  LLE Strength: WFL    Functional Mobility:  Bed Mobility:     Rolling Left:  stand by assistance  Supine to Sit: stand by assistance  Sit to Supine: stand by assistance  Transfers:     Sit to Stand:  stand by assistance with no AD  Gait: ambulated with HHA for 50 ft       AM-PAC 6 CLICK MOBILITY  Total Score:16       Treatment & Education:   PT CURRENTLY REQUIRES SBA with supine<>sit and sit <> stand with HHA when ambulating 50 ft for safety. PT assessed gaze stab, VOR and tracking and he was slow in speed and losing the object at midline at times with diagonals. Client denied dizziness with head turns in bed and supine to sit but states both were an issue prior to the hospital episode this am.  P.T. RECOMMENDS out patient PT for further vestibular assessment AT D/C    Patient left supine with all lines intact, call button in reach, and Pham, nurse notified.    GOALS:   Multidisciplinary Problems       Physical Therapy Goals          Problem: Physical Therapy    Goal Priority Disciplines Outcome Goal Variances Interventions   Physical Therapy Goal     PT, PT/OT      Description: LTG'S TO BE MET IN 14 DAYS (8/5/23)  PT WILL REQUIRE mod i FOR BED MOBILITY  PT WILL REQUIRE mod i FOR BED<>CHAIR TF'S  PT WILL  ft with SBA for safety  PT WILL INC AMPAC SCORE BY 2 POINTS TO PROGRESS GROSS FUNC MOBILITY                         History:     History reviewed. No pertinent past medical history.    Past Surgical History:   Procedure Laterality Date    PROSTATECTOMY  2019       Time Tracking:     PT Received On: 08/05/23  PT Start Time: 1000     PT Stop Time: 1030  PT Total Time (min): 30 min     Billable Minutes: Evaluation 15 and Therapeutic Activity 15      08/05/2023

## 2023-08-05 NOTE — HPI
Mr. Enrique Nichols is a 77 y.o. male patient with a PMHx of a HTN, HLD, prostate cancer s/p prostatectomy who presents to the Emergency Department for evaluation of altered mental status and dizziness which started last night. Patient started feeling unwell on Wednesday night and had a fever aroudn 101 that night.  Thursday [facility] where he was diagnosed with vertigo and sinus infection, he was started on steroids and amoxicillin.  Last night patients wife stated that he had another fever and was confused and dizzy and fell while coming back from the bathroom, did not hit head or lose consciousness.  He has been taking IBP around the clock for his fevers.  He denies any CP, N/V/D.  In the ED, tmax 103.1, o2 sats 86 on room air, placed on 2 liters.  Lab work notable for WBC 15, , .  UA with no signs of infection.  FLU and COVID negative.  CT head negative for any acute intracranial processes.  CTA chest negative for PE, Showed Moderate size ground-glass and airspace infiltrate left lower lobe.  Patient will be admitted to observation for vertigo, pna, hypoxia.      Code status Full  Surrogate Decision Maker wife Tiffanie

## 2023-08-06 LAB
ALBUMIN SERPL BCP-MCNC: 2.1 G/DL (ref 3.5–5.2)
ALP SERPL-CCNC: 63 U/L (ref 55–135)
ALT SERPL W/O P-5'-P-CCNC: 140 U/L (ref 10–44)
ANION GAP SERPL CALC-SCNC: 11 MMOL/L (ref 8–16)
AST SERPL-CCNC: 82 U/L (ref 10–40)
BASOPHILS # BLD AUTO: 0.02 K/UL (ref 0–0.2)
BASOPHILS NFR BLD: 0.2 % (ref 0–1.9)
BILIRUB SERPL-MCNC: 0.6 MG/DL (ref 0.1–1)
BUN SERPL-MCNC: 15 MG/DL (ref 8–23)
CALCIUM SERPL-MCNC: 8.1 MG/DL (ref 8.7–10.5)
CHLORIDE SERPL-SCNC: 100 MMOL/L (ref 95–110)
CO2 SERPL-SCNC: 19 MMOL/L (ref 23–29)
CREAT SERPL-MCNC: 1 MG/DL (ref 0.5–1.4)
DIFFERENTIAL METHOD: ABNORMAL
EOSINOPHIL # BLD AUTO: 0 K/UL (ref 0–0.5)
EOSINOPHIL NFR BLD: 0.1 % (ref 0–8)
ERYTHROCYTE [DISTWIDTH] IN BLOOD BY AUTOMATED COUNT: 13.1 % (ref 11.5–14.5)
EST. GFR  (NO RACE VARIABLE): >60 ML/MIN/1.73 M^2
GLUCOSE SERPL-MCNC: 112 MG/DL (ref 70–110)
HCT VFR BLD AUTO: 34 % (ref 40–54)
HGB BLD-MCNC: 11.8 G/DL (ref 14–18)
IMM GRANULOCYTES # BLD AUTO: 0.22 K/UL (ref 0–0.04)
IMM GRANULOCYTES NFR BLD AUTO: 1.8 % (ref 0–0.5)
LYMPHOCYTES # BLD AUTO: 0.7 K/UL (ref 1–4.8)
LYMPHOCYTES NFR BLD: 5.9 % (ref 18–48)
MCH RBC QN AUTO: 29.4 PG (ref 27–31)
MCHC RBC AUTO-ENTMCNC: 34.7 G/DL (ref 32–36)
MCV RBC AUTO: 85 FL (ref 82–98)
MONOCYTES # BLD AUTO: 1.3 K/UL (ref 0.3–1)
MONOCYTES NFR BLD: 10.6 % (ref 4–15)
NEUTROPHILS # BLD AUTO: 10 K/UL (ref 1.8–7.7)
NEUTROPHILS NFR BLD: 81.4 % (ref 38–73)
NRBC BLD-RTO: 0 /100 WBC
PLATELET # BLD AUTO: 164 K/UL (ref 150–450)
PMV BLD AUTO: 10.2 FL (ref 9.2–12.9)
POTASSIUM SERPL-SCNC: 4.4 MMOL/L (ref 3.5–5.1)
PROT SERPL-MCNC: 6.1 G/DL (ref 6–8.4)
RBC # BLD AUTO: 4.01 M/UL (ref 4.6–6.2)
SODIUM SERPL-SCNC: 130 MMOL/L (ref 136–145)
WBC # BLD AUTO: 12.27 K/UL (ref 3.9–12.7)

## 2023-08-06 PROCEDURE — 63600175 PHARM REV CODE 636 W HCPCS: Performed by: NURSE PRACTITIONER

## 2023-08-06 PROCEDURE — 94761 N-INVAS EAR/PLS OXIMETRY MLT: CPT

## 2023-08-06 PROCEDURE — 63600175 PHARM REV CODE 636 W HCPCS: Performed by: INTERNAL MEDICINE

## 2023-08-06 PROCEDURE — 87205 SMEAR GRAM STAIN: CPT | Performed by: INTERNAL MEDICINE

## 2023-08-06 PROCEDURE — 25000003 PHARM REV CODE 250: Performed by: INTERNAL MEDICINE

## 2023-08-06 PROCEDURE — 96366 THER/PROPH/DIAG IV INF ADDON: CPT

## 2023-08-06 PROCEDURE — 96361 HYDRATE IV INFUSION ADD-ON: CPT

## 2023-08-06 PROCEDURE — 27000646 HC AEROBIKA DEVICE

## 2023-08-06 PROCEDURE — 80053 COMPREHEN METABOLIC PANEL: CPT | Performed by: NURSE PRACTITIONER

## 2023-08-06 PROCEDURE — 94664 DEMO&/EVAL PT USE INHALER: CPT

## 2023-08-06 PROCEDURE — 87798 DETECT AGENT NOS DNA AMP: CPT | Performed by: INTERNAL MEDICINE

## 2023-08-06 PROCEDURE — 96367 TX/PROPH/DG ADDL SEQ IV INF: CPT

## 2023-08-06 PROCEDURE — 87040 BLOOD CULTURE FOR BACTERIA: CPT | Performed by: INTERNAL MEDICINE

## 2023-08-06 PROCEDURE — 36415 COLL VENOUS BLD VENIPUNCTURE: CPT | Performed by: INTERNAL MEDICINE

## 2023-08-06 PROCEDURE — 87449 NOS EACH ORGANISM AG IA: CPT | Performed by: INTERNAL MEDICINE

## 2023-08-06 PROCEDURE — 99900035 HC TECH TIME PER 15 MIN (STAT)

## 2023-08-06 PROCEDURE — G0378 HOSPITAL OBSERVATION PER HR: HCPCS

## 2023-08-06 PROCEDURE — 27000221 HC OXYGEN, UP TO 24 HOURS

## 2023-08-06 PROCEDURE — 36415 COLL VENOUS BLD VENIPUNCTURE: CPT | Performed by: NURSE PRACTITIONER

## 2023-08-06 PROCEDURE — 87070 CULTURE OTHR SPECIMN AEROBIC: CPT | Performed by: INTERNAL MEDICINE

## 2023-08-06 PROCEDURE — 25000003 PHARM REV CODE 250: Performed by: NURSE PRACTITIONER

## 2023-08-06 PROCEDURE — 85025 COMPLETE CBC W/AUTO DIFF WBC: CPT | Performed by: INTERNAL MEDICINE

## 2023-08-06 PROCEDURE — 96372 THER/PROPH/DIAG INJ SC/IM: CPT | Performed by: NURSE PRACTITIONER

## 2023-08-06 RX ORDER — FLUTICASONE PROPIONATE 50 MCG
2 SPRAY, SUSPENSION (ML) NASAL DAILY
Status: DISCONTINUED | OUTPATIENT
Start: 2023-08-06 | End: 2023-08-09 | Stop reason: HOSPADM

## 2023-08-06 RX ORDER — GUAIFENESIN 600 MG/1
600 TABLET, EXTENDED RELEASE ORAL 2 TIMES DAILY
Status: DISCONTINUED | OUTPATIENT
Start: 2023-08-06 | End: 2023-08-09 | Stop reason: HOSPADM

## 2023-08-06 RX ORDER — SODIUM CHLORIDE 9 MG/ML
INJECTION, SOLUTION INTRAVENOUS
Status: DISCONTINUED | OUTPATIENT
Start: 2023-08-06 | End: 2023-08-09 | Stop reason: HOSPADM

## 2023-08-06 RX ORDER — OXYMETAZOLINE HCL 0.05 %
2 SPRAY, NON-AEROSOL (ML) NASAL 2 TIMES DAILY
Status: DISPENSED | OUTPATIENT
Start: 2023-08-06 | End: 2023-08-09

## 2023-08-06 RX ADMIN — SODIUM CHLORIDE: 9 INJECTION, SOLUTION INTRAVENOUS at 09:08

## 2023-08-06 RX ADMIN — GUAIFENESIN 600 MG: 600 TABLET, EXTENDED RELEASE ORAL at 08:08

## 2023-08-06 RX ADMIN — NASAL DECONGESTANT 2 SPRAY: 0.05 SPRAY NASAL at 08:08

## 2023-08-06 RX ADMIN — ENOXAPARIN SODIUM 40 MG: 40 INJECTION SUBCUTANEOUS at 04:08

## 2023-08-06 RX ADMIN — CEFTRIAXONE 2 G: 2 INJECTION, POWDER, FOR SOLUTION INTRAMUSCULAR; INTRAVENOUS at 09:08

## 2023-08-06 RX ADMIN — ASPIRIN 81 MG CHEWABLE TABLET 81 MG: 81 TABLET CHEWABLE at 08:08

## 2023-08-06 RX ADMIN — ACETAMINOPHEN 650 MG: 325 TABLET ORAL at 06:08

## 2023-08-06 RX ADMIN — AZITHROMYCIN MONOHYDRATE 500 MG: 500 INJECTION, POWDER, LYOPHILIZED, FOR SOLUTION INTRAVENOUS at 10:08

## 2023-08-06 RX ADMIN — SODIUM CHLORIDE, POTASSIUM CHLORIDE, SODIUM LACTATE AND CALCIUM CHLORIDE: 600; 310; 30; 20 INJECTION, SOLUTION INTRAVENOUS at 05:08

## 2023-08-06 RX ADMIN — SODIUM CHLORIDE, POTASSIUM CHLORIDE, SODIUM LACTATE AND CALCIUM CHLORIDE: 600; 310; 30; 20 INJECTION, SOLUTION INTRAVENOUS at 07:08

## 2023-08-06 NOTE — PLAN OF CARE
Care plan reviewed with patient. Still on fluids.  Free from falls. No other complaints made. All orders checked and reviewed.

## 2023-08-06 NOTE — HOSPITAL COURSE
Patient admitted for left lower lobe pneumonia, hypoxia, and vertigo.  He was started on IV antibiotics to include ceftriaxone and azithromycin was added.  LFTs were elevated and hepatitis panel returned negative.  His felt that this might be related to viral etiology and respiratory viral panel ordered.  Upon further review of CT of chest there are areas that appear to be micro abscesses and significant lymphadenopathy.  At this point the plan will be to continue antibiotics for 10-14 day course and follow up with Pulmonary in 4-6 weeks for repeat CT scan of the chest.    8/7 He cont with high fever . The Blood and sputum  cx NGTD . He has been treated for PNA  with azithromycin and rocephin .  Respiratory panel pending .  8/8 Pt report feeeling better after 1 lt bolus yesterday . The Bp has improved . The RUQ US did not show any acute  liver or gallbladder problems . Suspect right-sided hydronephrosis . He has been afebrile for 24 hrs .   8/9 78 y/o WM admitted with a dx of  CAP . Started initially on rocephin /azithromycin  and subsequently  changed to Levaquin  due to refractroy fever . Pt has bene afebrile since change to levaquin . The blood and sputum cx NGTD . Respiratory panel , COVID and flue  were Negative . The LFU are mild elevated most likely due to ETOH abuse . The retroperitoneal us show mild right side hydronephrosis . He has a good uop with normal kidney function . He voice has a urology and will f/u him for this problem . Pt dizziness and orthostatic hypotension  improved  with aggressive IVF . Pt was seen and examined at bedside . He was determined to be suitable for d./c

## 2023-08-06 NOTE — PROGRESS NOTES
[facility] Medicine  Progress Note    Patient Name: Shelby Nunez Jr.  MRN: [id number]  Patient Class: OP- Observation   Admission Date: 8/5/2023  Length of Stay: 0 days  Attending Physician: Alesia Morin MD  Primary Care Provider: Kassandra, Primary Doctor        Subjective:     Principal Problem:Acute hypoxemic respiratory failure        HPI:  Mr. Enrique Nichols is a 77 y.o. male patient with a PMHx of a HTN, HLD, prostate cancer s/p prostatectomy who presents to the Emergency Department for evaluation of altered mental status and dizziness which started last night. Patient started feeling unwell on Wednesday night and had a fever aroudn 101 that night.  Thursday his [facility] where he was diagnosed with vertigo and sinus infection, he was started on steroids and amoxicillin.  Last night patients wife stated that he had another fever and was confused and dizzy and fell while coming back from the bathroom, did not hit head or lose consciousness.  He has been taking IBP around the clock for his fevers.  He denies any CP, N/V/D.  In the ED, tmax 103.1, o2 sats 86 on room air, placed on 2 liters.  Lab work notable for WBC 15, , .  UA with no signs of infection.  FLU and COVID negative.  CT head negative for any acute intracranial processes.  CTA chest negative for PE, Showed Moderate size ground-glass and airspace infiltrate left lower lobe.  Patient will be admitted to observation for vertigo, pna, hypoxia.      Code status Full  Surrogate Decision Maker wife Tiffanie      Overview/Hospital Course:  Patient admitted for left lower lobe pneumonia, hypoxia, and vertigo.  He was started on IV antibiotics to include ceftriaxone and azithromycin was added.  LFTs were elevated and hepatitis panel returned negative.  His felt that this might be related to viral etiology and respiratory viral panel ordered.  Upon further review of CT of chest there are areas that appear to be  micro abscesses and significant lymphadenopathy.  At this point the plan will be to continue antibiotics for 10-14 day course and follow up with Pulmonary in 4-6 weeks for repeat CT scan of the chest.      Interval History:  Patient seen and examined with wife at bedside.  Patient is without complaints.  He is wearing oxygen lying flat in bed.  He denies any headache, nausea, vomiting.  Also no shortness a breath.    Review of Systems   Constitutional:  Positive for appetite change, fatigue and fever.   Neurological:  Positive for dizziness and weakness.     Objective:     Vital Signs (Most Recent):  Temp: 98.7 °F (37.1 °C) (08/06/23 1206)  Pulse: 108 (08/06/23 1137)  Resp: (!) 24 (08/06/23 1137)  BP: 117/62 (08/06/23 1206)  SpO2: (!) 94 % (08/06/23 1137) Vital Signs (24h Range):  Temp:  [97.9 °F (36.6 °C)-103 °F (39.4 °C)] 98.7 °F (37.1 °C)  Pulse:  [] 108  Resp:  [16-24] 24  SpO2:  [92 %-95 %] 94 %  BP: ()/(51-89) 117/62     Weight: 103 kg (227 lb 1.2 oz)  Body mass index is 30.8 kg/m².    Intake/Output Summary (Last 24 hours) at 8/6/2023 1512  Last data filed at 8/6/2023 0907  Gross per 24 hour   Intake 276.61 ml   Output 400 ml   Net -123.39 ml         Physical Exam  Constitutional:       Appearance: He is ill-appearing.   HENT:      Nose: Nose normal.      Mouth/Throat:      Mouth: Mucous membranes are moist.      Pharynx: Oropharynx is clear.   Eyes:      Extraocular Movements: Extraocular movements intact.      Conjunctiva/sclera: Conjunctivae normal.   Cardiovascular:      Rate and Rhythm: Normal rate and regular rhythm.      Pulses: Normal pulses.      Heart sounds: Normal heart sounds.   Pulmonary:      Effort: Pulmonary effort is normal.      Comments: Diminished breath sounds all lobes, on 2 liters nasal cannula   Abdominal:      General: Bowel sounds are normal. There is no distension.      Palpations: Abdomen is soft.      Tenderness: There is no abdominal tenderness.   Musculoskeletal:          General: No swelling. Normal range of motion.      Cervical back: Normal range of motion and neck supple.   Skin:     General: Skin is warm and dry.   Neurological:      General: No focal deficit present.      Mental Status: He is alert and oriented to person, place, and time.             Significant Labs: All pertinent labs within the past 24 hours have been reviewed.  Blood Culture:   Recent Labs   Lab 08/05/23  0800 08/05/23  0801   LABBLOO No Growth to date No Growth to date     CBC:   Recent Labs   Lab 08/05/23  0447 08/06/23  0731   WBC 15.20* 12.27   HGB 13.1* 11.8*   HCT 38.0* 34.0*    164     CMP:   Recent Labs   Lab 08/05/23  0447 08/06/23  0605   * 130*   K 4.0 4.4    100   CO2 23 19*   * 112*   BUN 25* 15   CREATININE 1.3 1.0   CALCIUM 8.8 8.1*   PROT 7.2 6.1   ALBUMIN 2.9* 2.1*   BILITOT 0.6 0.6   ALKPHOS 78 63   * 82*   * 140*   ANIONGAP 11 11     Lactic Acid:   Recent Labs   Lab 08/05/23  0801   LACTATE 1.6       Respiratory Culture:  Pending  Significant Imaging: I have reviewed all pertinent imaging results/findings within the past 24 hours.      Assessment/Plan:      * Acute hypoxemic respiratory failure  Patient with Hypoxic Respiratory failure which is Acute.  he is not on home oxygen. Supplemental oxygen was provided and noted- Oxygen Concentration (%):  [36] 36  Signs/symptoms of respiratory failure include- tachypnea. Contributing diagnoses includes - Pneumonia Labs and images were reviewed. Patient Has recent ABG, which has been reviewed. Will treat underlying causes and adjust management of respiratory failure as follows    Ct chest showed: Moderate size ground-glass and airspace infiltrate left lower lobe  Continue iv abx, added azithromycin  Wean o2 as tolerated  Duo nebs as needed  Check viral respiratory panel  We will need repeat CT scan of the chest in 4-6 weeks to follow up on lymphadenopathy an infiltrate.    Vertigo  Pt/ot eval  meclizine  as needed  Fall precautions        Mixed hyperlipidemia  Holding statin d/t elevated ALT/AST      HTN (hypertension)  bp low/normotensive, holding home amlodipine/benazpril at this time      Transaminitis  Questionable viral etiology  Check hep panel  Trend, if not improving consider ab ultrasound      VTE Risk Mitigation (From admission, onward)         Ordered     enoxaparin injection 40 mg  Daily         08/05/23 0920     IP VTE HIGH RISK PATIENT  Once         08/05/23 0920     Place sequential compression device  Until discontinued         08/05/23 0920                Discharge Planning   DYLAN:      Code Status: Full Code   Is the patient medically ready for discharge?: No    Reason for patient still in hospital (select all that apply): Patient trending condition, Treatment and Imaging                     Alesia Frias MD  Department of Hospital Medicine   [facility] - Med Surg

## 2023-08-06 NOTE — SUBJECTIVE & OBJECTIVE
Interval History:  Patient seen and examined with wife at bedside.  Patient is without complaints.  He is wearing oxygen lying flat in bed.  He denies any headache, nausea, vomiting.  Also no shortness a breath.    Review of Systems   Constitutional:  Positive for appetite change, fatigue and fever.   Neurological:  Positive for dizziness and weakness.     Objective:     Vital Signs (Most Recent):  Temp: 98.7 °F (37.1 °C) (08/06/23 1206)  Pulse: 108 (08/06/23 1137)  Resp: (!) 24 (08/06/23 1137)  BP: 117/62 (08/06/23 1206)  SpO2: (!) 94 % (08/06/23 1137) Vital Signs (24h Range):  Temp:  [97.9 °F (36.6 °C)-103 °F (39.4 °C)] 98.7 °F (37.1 °C)  Pulse:  [] 108  Resp:  [16-24] 24  SpO2:  [92 %-95 %] 94 %  BP: ()/(51-89) 117/62     Weight: 103 kg (227 lb 1.2 oz)  Body mass index is 30.8 kg/m².    Intake/Output Summary (Last 24 hours) at 8/6/2023 1512  Last data filed at 8/6/2023 0907  Gross per 24 hour   Intake 276.61 ml   Output 400 ml   Net -123.39 ml         Physical Exam  Constitutional:       Appearance: He is ill-appearing.   HENT:      Nose: Nose normal.      Mouth/Throat:      Mouth: Mucous membranes are moist.      Pharynx: Oropharynx is clear.   Eyes:      Extraocular Movements: Extraocular movements intact.      Conjunctiva/sclera: Conjunctivae normal.   Cardiovascular:      Rate and Rhythm: Normal rate and regular rhythm.      Pulses: Normal pulses.      Heart sounds: Normal heart sounds.   Pulmonary:      Effort: Pulmonary effort is normal.      Comments: Diminished breath sounds all lobes, on 2 liters nasal cannula   Abdominal:      General: Bowel sounds are normal. There is no distension.      Palpations: Abdomen is soft.      Tenderness: There is no abdominal tenderness.   Musculoskeletal:         General: No swelling. Normal range of motion.      Cervical back: Normal range of motion and neck supple.   Skin:     General: Skin is warm and dry.   Neurological:      General: No focal deficit  present.      Mental Status: He is alert and oriented to person, place, and time.             Significant Labs: All pertinent labs within the past 24 hours have been reviewed.  Blood Culture:   Recent Labs   Lab 08/05/23  0800 08/05/23  0801   LABBLOO No Growth to date No Growth to date     CBC:   Recent Labs   Lab 08/05/23  0447 08/06/23  0731   WBC 15.20* 12.27   HGB 13.1* 11.8*   HCT 38.0* 34.0*    164     CMP:   Recent Labs   Lab 08/05/23  0447 08/06/23  0605   * 130*   K 4.0 4.4    100   CO2 23 19*   * 112*   BUN 25* 15   CREATININE 1.3 1.0   CALCIUM 8.8 8.1*   PROT 7.2 6.1   ALBUMIN 2.9* 2.1*   BILITOT 0.6 0.6   ALKPHOS 78 63   * 82*   * 140*   ANIONGAP 11 11     Lactic Acid:   Recent Labs   Lab 08/05/23  0801   LACTATE 1.6       Respiratory Culture:  Pending  Significant Imaging: I have reviewed all pertinent imaging results/findings within the past 24 hours.

## 2023-08-06 NOTE — ASSESSMENT & PLAN NOTE
Patient with Hypoxic Respiratory failure which is Acute.  he is not on home oxygen. Supplemental oxygen was provided and noted- Oxygen Concentration (%):  [36] 36  Signs/symptoms of respiratory failure include- tachypnea. Contributing diagnoses includes - Pneumonia Labs and images were reviewed. Patient Has recent ABG, which has been reviewed. Will treat underlying causes and adjust management of respiratory failure as follows    Ct chest showed: Moderate size ground-glass and airspace infiltrate left lower lobe  Continue iv abx, added azithromycin  Wean o2 as tolerated  Duo nebs as needed  Check viral respiratory panel  We will need repeat CT scan of the chest in 4-6 weeks to follow up on lymphadenopathy an infiltrate.

## 2023-08-07 LAB
ADENOVIRUS: NOT DETECTED
ALBUMIN SERPL BCP-MCNC: 1.9 G/DL (ref 3.5–5.2)
ALP SERPL-CCNC: 69 U/L (ref 55–135)
ALT SERPL W/O P-5'-P-CCNC: 273 U/L (ref 10–44)
ANION GAP SERPL CALC-SCNC: 9 MMOL/L (ref 8–16)
AST SERPL-CCNC: 326 U/L (ref 10–40)
BASOPHILS # BLD AUTO: 0.03 K/UL (ref 0–0.2)
BASOPHILS NFR BLD: 0.3 % (ref 0–1.9)
BILIRUB SERPL-MCNC: 0.4 MG/DL (ref 0.1–1)
BORDETELLA PARAPERTUSSIS (IS1001): NOT DETECTED
BORDETELLA PERTUSSIS (PTXP): NOT DETECTED
BUN SERPL-MCNC: 17 MG/DL (ref 8–23)
CALCIUM SERPL-MCNC: 8.3 MG/DL (ref 8.7–10.5)
CHLAMYDIA PNEUMONIAE: NOT DETECTED
CHLORIDE SERPL-SCNC: 100 MMOL/L (ref 95–110)
CO2 SERPL-SCNC: 24 MMOL/L (ref 23–29)
COMPLEXED PSA SERPL-MCNC: <0.01 NG/ML (ref 0–4)
CORONAVIRUS 229E, COMMON COLD VIRUS: NOT DETECTED
CORONAVIRUS HKU1, COMMON COLD VIRUS: NOT DETECTED
CORONAVIRUS NL63, COMMON COLD VIRUS: NOT DETECTED
CORONAVIRUS OC43, COMMON COLD VIRUS: NOT DETECTED
CREAT SERPL-MCNC: 1 MG/DL (ref 0.5–1.4)
DIFFERENTIAL METHOD: ABNORMAL
EOSINOPHIL # BLD AUTO: 0.2 K/UL (ref 0–0.5)
EOSINOPHIL NFR BLD: 2.1 % (ref 0–8)
ERYTHROCYTE [DISTWIDTH] IN BLOOD BY AUTOMATED COUNT: 13.1 % (ref 11.5–14.5)
EST. GFR  (NO RACE VARIABLE): >60 ML/MIN/1.73 M^2
FLUBV RNA NPH QL NAA+NON-PROBE: NOT DETECTED
GLUCOSE SERPL-MCNC: 107 MG/DL (ref 70–110)
HCT VFR BLD AUTO: 37.1 % (ref 40–54)
HGB BLD-MCNC: 12.5 G/DL (ref 14–18)
HPIV1 RNA NPH QL NAA+NON-PROBE: NOT DETECTED
HPIV2 RNA NPH QL NAA+NON-PROBE: NOT DETECTED
HPIV3 RNA NPH QL NAA+NON-PROBE: NOT DETECTED
HPIV4 RNA NPH QL NAA+NON-PROBE: NOT DETECTED
HUMAN METAPNEUMOVIRUS: NOT DETECTED
IMM GRANULOCYTES # BLD AUTO: 0.32 K/UL (ref 0–0.04)
IMM GRANULOCYTES NFR BLD AUTO: 3.1 % (ref 0–0.5)
LYMPHOCYTES # BLD AUTO: 0.8 K/UL (ref 1–4.8)
LYMPHOCYTES NFR BLD: 7.5 % (ref 18–48)
MCH RBC QN AUTO: 28.9 PG (ref 27–31)
MCHC RBC AUTO-ENTMCNC: 33.7 G/DL (ref 32–36)
MCV RBC AUTO: 86 FL (ref 82–98)
MONOCYTES # BLD AUTO: 1.2 K/UL (ref 0.3–1)
MONOCYTES NFR BLD: 11.9 % (ref 4–15)
MYCOPLASMA PNEUMONIAE: NOT DETECTED
NEUTROPHILS # BLD AUTO: 7.8 K/UL (ref 1.8–7.7)
NEUTROPHILS NFR BLD: 75.1 % (ref 38–73)
NRBC BLD-RTO: 0 /100 WBC
PLATELET # BLD AUTO: 184 K/UL (ref 150–450)
PMV BLD AUTO: 10.6 FL (ref 9.2–12.9)
POTASSIUM SERPL-SCNC: 3.9 MMOL/L (ref 3.5–5.1)
PROT SERPL-MCNC: 5.5 G/DL (ref 6–8.4)
RBC # BLD AUTO: 4.33 M/UL (ref 4.6–6.2)
RESPIRATORY INFECTION PANEL SOURCE: NORMAL
RSV RNA NPH QL NAA+NON-PROBE: NOT DETECTED
RV+EV RNA NPH QL NAA+NON-PROBE: NOT DETECTED
SARS-COV-2 RNA RESP QL NAA+PROBE: NOT DETECTED
SODIUM SERPL-SCNC: 133 MMOL/L (ref 136–145)
WBC # BLD AUTO: 10.4 K/UL (ref 3.9–12.7)

## 2023-08-07 PROCEDURE — 97116 GAIT TRAINING THERAPY: CPT | Mod: CQ

## 2023-08-07 PROCEDURE — 84153 ASSAY OF PSA TOTAL: CPT | Performed by: INTERNAL MEDICINE

## 2023-08-07 PROCEDURE — 25000003 PHARM REV CODE 250: Performed by: INTERNAL MEDICINE

## 2023-08-07 PROCEDURE — 63600175 PHARM REV CODE 636 W HCPCS: Performed by: INTERNAL MEDICINE

## 2023-08-07 PROCEDURE — 63600175 PHARM REV CODE 636 W HCPCS: Performed by: NURSE PRACTITIONER

## 2023-08-07 PROCEDURE — 96361 HYDRATE IV INFUSION ADD-ON: CPT

## 2023-08-07 PROCEDURE — 36415 COLL VENOUS BLD VENIPUNCTURE: CPT | Performed by: INTERNAL MEDICINE

## 2023-08-07 PROCEDURE — 99900035 HC TECH TIME PER 15 MIN (STAT)

## 2023-08-07 PROCEDURE — 94664 DEMO&/EVAL PT USE INHALER: CPT

## 2023-08-07 PROCEDURE — 11000001 HC ACUTE MED/SURG PRIVATE ROOM

## 2023-08-07 PROCEDURE — 85025 COMPLETE CBC W/AUTO DIFF WBC: CPT | Performed by: NURSE PRACTITIONER

## 2023-08-07 PROCEDURE — 96375 TX/PRO/DX INJ NEW DRUG ADDON: CPT

## 2023-08-07 PROCEDURE — 25000003 PHARM REV CODE 250: Performed by: NURSE PRACTITIONER

## 2023-08-07 PROCEDURE — 80053 COMPREHEN METABOLIC PANEL: CPT | Performed by: NURSE PRACTITIONER

## 2023-08-07 PROCEDURE — 27000646 HC AEROBIKA DEVICE

## 2023-08-07 PROCEDURE — 94761 N-INVAS EAR/PLS OXIMETRY MLT: CPT

## 2023-08-07 PROCEDURE — 96366 THER/PROPH/DIAG IV INF ADDON: CPT

## 2023-08-07 PROCEDURE — 97530 THERAPEUTIC ACTIVITIES: CPT | Mod: CQ

## 2023-08-07 RX ORDER — LEVOFLOXACIN 750 MG/1
750 TABLET ORAL DAILY
Status: DISCONTINUED | OUTPATIENT
Start: 2023-08-07 | End: 2023-08-07

## 2023-08-07 RX ORDER — LEVOFLOXACIN 5 MG/ML
750 INJECTION, SOLUTION INTRAVENOUS
Status: DISCONTINUED | OUTPATIENT
Start: 2023-08-07 | End: 2023-08-09 | Stop reason: HOSPADM

## 2023-08-07 RX ADMIN — SODIUM CHLORIDE, POTASSIUM CHLORIDE, SODIUM LACTATE AND CALCIUM CHLORIDE: 600; 310; 30; 20 INJECTION, SOLUTION INTRAVENOUS at 07:08

## 2023-08-07 RX ADMIN — ENOXAPARIN SODIUM 40 MG: 40 INJECTION SUBCUTANEOUS at 05:08

## 2023-08-07 RX ADMIN — CEFTRIAXONE 2 G: 2 INJECTION, POWDER, FOR SOLUTION INTRAMUSCULAR; INTRAVENOUS at 09:08

## 2023-08-07 RX ADMIN — NASAL DECONGESTANT 2 SPRAY: 0.05 SPRAY NASAL at 09:08

## 2023-08-07 RX ADMIN — AZITHROMYCIN MONOHYDRATE 500 MG: 500 INJECTION, POWDER, LYOPHILIZED, FOR SOLUTION INTRAVENOUS at 10:08

## 2023-08-07 RX ADMIN — LEVOFLOXACIN 750 MG: 750 INJECTION, SOLUTION INTRAVENOUS at 03:08

## 2023-08-07 RX ADMIN — SODIUM CHLORIDE: 9 INJECTION, SOLUTION INTRAVENOUS at 09:08

## 2023-08-07 RX ADMIN — ASPIRIN 81 MG CHEWABLE TABLET 81 MG: 81 TABLET CHEWABLE at 09:08

## 2023-08-07 RX ADMIN — SODIUM CHLORIDE 1000 ML: 9 INJECTION, SOLUTION INTRAVENOUS at 03:08

## 2023-08-07 RX ADMIN — SODIUM CHLORIDE: 9 INJECTION, SOLUTION INTRAVENOUS at 03:08

## 2023-08-07 RX ADMIN — GUAIFENESIN 600 MG: 600 TABLET, EXTENDED RELEASE ORAL at 09:08

## 2023-08-07 NOTE — PT/OT/SLP PROGRESS
Physical Therapy  Treatment    Shelby Nunez Jr.   MRN: [id number]   Admitting Diagnosis: Acute hypoxemic respiratory failure    PT Received On: 08/07/23  PT Start Time: 1105     PT Stop Time: 1135    PT Total Time (min): 30 min       Billable Minutes:  Gait Training 15 and Therapeutic Activity 15    Treatment Type: Treatment  PT/PTA: PTA     Number of PTA visits since last PT visit: 1       General Precautions: Standard, fall  Orthopedic Precautions: N/A  Braces: N/A  Respiratory Status: Nasal cannula, flow 2 L/min    Spiritual, Cultural Beliefs, Christianity Practices, Values that Affect Care: yes    Subjective:  Communicated with patient's nurse, Saundra, and completed Epic chart review prior to session.  Patient agreed to PT session.   Patient's wife expressed concern about patient's current level of function and weakness she has observed prior to PT session.     Pain/Comfort  Pain Rating 1: 0/10  Pain Rating Post-Intervention 1: 0/10    Objective:   Patient found with: peripheral IV, oxygen    Supine > sit EOB: Modified Independent     Scoot towards EOB: Independent    STS from EOB > RW: SBA (VC for hand placement)    250ft w/ RW CGA-SBA     Stand pivot T/F to chair w/ RW: SBA (VC for sequencing of task)    Completed x15 reps AROM TE to BLE: LAQ, Hip Flex, AP   Intermittent cues given as needed to maintain correct form during repetitions     Educated patient on importance of increased tolerance to upright position and direct impact on CV endurance and strength. Patient encouraged to sit up in chair/ EOB, for a minimum of 2 consecutive hours, 3x per day. Encouraged patient to perform AROM TE to BLE throughout the day within all available planes of motion. Re enforced importance of utilizing call light to meet needs in room and not attempt to get up without staff assistance. Patient verbalized understanding and agreed to comply.      AM-PAC 6 CLICK MOBILITY  How much help from another person does this patient currently  need?   1 = Unable, Total/Dependent Assistance  2 = A lot, Maximum/Moderate Assistance  3 = A little, Minimum/Contact Guard/Supervision  4 = None, Modified [place]/Independent    Turning over in bed (including adjusting bedclothes, sheets and blankets)?: 4  Sitting down on and standing up from a chair with arms (e.g., wheelchair, bedside commode, etc.): 4  Moving from lying on back to sitting on the side of the bed?: 4  Moving to and from a bed to a chair (including a wheelchair)?: 4  Need to walk in hospital room?: 3  Climbing 3-5 steps with a railing?: 1 (NT)  Basic Mobility Total Score: 20    AM-PAC Raw Score CMS G-Code Modifier Level of Impairment Assistance   6 % Total / Unable   7 - 9 CM 80 - 100% Maximal Assist   10 - 14 CL 60 - 80% Moderate Assist   15 - 19 CK 40 - 60% Moderate Assist   20 - 22 CJ 20 - 40% Minimal Assist   23 CI 1-20% SBA / CGA   24 CH 0% Independent/ Mod I     Patient left up in chair with call button in reach and wife present.    Assessment:  Shelby Nunez Jr. is a 77 y.o. male with a medical diagnosis of Acute hypoxemic respiratory failure and presents with overall decline in functional mobility. Patient would continue to benefit from skilled PT to address functional limitations listed below in order to return to PLOF/decrease caregiver burden. Patient was able to significantly progress gait distance today with use of RW for balance. Patient demonstrated good safety awareness throughout session and experienced no LOB at any time.     Rehab identified problem list/impairments: weakness, impaired self care skills, impaired functional mobility, gait instability, impaired balance, decreased safety awareness, impaired cardiopulmonary response to activity    Rehab potential is good.    Activity tolerance: Good    Discharge recommendations: outpatient PT      Barriers to discharge:      Equipment recommendations: walker, rolling     GOALS:   Multidisciplinary Problems       Physical  Therapy Goals          Problem: Physical Therapy    Goal Priority Disciplines Outcome Goal Variances Interventions   Physical Therapy Goal     PT, PT/OT      Description: LTG'S TO BE MET IN 14 DAYS (8/5/23)  PT WILL REQUIRE mod i FOR BED MOBILITY  PT WILL REQUIRE mod i FOR BED<>CHAIR TF'S  PT WILL  ft with RW with SBA for safety  PT WILL INC AMPAC SCORE BY 2 POINTS TO PROGRESS GROSS FUNC MOBILITY                         PLAN:    Patient to be seen 3 x/week to address the above listed problems via gait training, therapeutic activities, therapeutic exercises  Plan of Care expires: 08/19/23  Plan of Care reviewed with: patient, spouse         08/07/2023

## 2023-08-07 NOTE — ASSESSMENT & PLAN NOTE
bp low/normotensive, holding home amlodipine/benazpril at this time    BP  Low normal . Will order 1 lt bolus

## 2023-08-07 NOTE — ASSESSMENT & PLAN NOTE
Patient with Hypoxic Respiratory failure which is Acute.  he is not on home oxygen. Supplemental oxygen was provided and noted-    Signs/symptoms of respiratory failure include- tachypnea. Contributing diagnoses includes - Pneumonia Labs and images were reviewed. Patient Has recent ABG, which has been reviewed. Will treat underlying causes and adjust management of respiratory failure as follows    Ct chest showed: Moderate size ground-glass and airspace infiltrate left lower lobe  Continue iv abx, added azithromycin  Wean o2 as tolerated  Duo nebs as needed  Check viral respiratory panel  We will need repeat CT scan of the chest in 4-6 weeks to follow up on lymphadenopathy an infiltrate.

## 2023-08-07 NOTE — SUBJECTIVE & OBJECTIVE
Interval History:     Review of Systems   Constitutional:  Positive for appetite change, fatigue and fever.   Neurological:  Positive for dizziness and weakness.     Objective:     Vital Signs (Most Recent):  Temp: 97.5 °F (36.4 °C) (08/07/23 1153)  Pulse: 98 (08/07/23 1153)  Resp: 16 (08/07/23 1153)  BP: 114/68 (08/07/23 1210)  SpO2: (!) 94 % (08/07/23 1153) Vital Signs (24h Range):  Temp:  [97.5 °F (36.4 °C)-101.1 °F (38.4 °C)] 97.5 °F (36.4 °C)  Pulse:  [80-98] 98  Resp:  [16-20] 16  SpO2:  [91 %-95 %] 94 %  BP: ()/(56-73) 114/68     Weight: 103 kg (227 lb 1.2 oz)  Body mass index is 30.8 kg/m².    Intake/Output Summary (Last 24 hours) at 8/7/2023 1434  Last data filed at 8/7/2023 0915  Gross per 24 hour   Intake 918.19 ml   Output 400 ml   Net 518.19 ml         Physical Exam  Constitutional:       Appearance: He is ill-appearing.   HENT:      Nose: Nose normal.      Mouth/Throat:      Mouth: Mucous membranes are moist.      Pharynx: Oropharynx is clear.   Eyes:      Extraocular Movements: Extraocular movements intact.      Conjunctiva/sclera: Conjunctivae normal.   Cardiovascular:      Rate and Rhythm: Normal rate and regular rhythm.      Pulses: Normal pulses.      Heart sounds: Normal heart sounds.   Pulmonary:      Effort: Pulmonary effort is normal.      Comments: Diminished breath sounds all lobes, on 2 liters nasal cannula   Abdominal:      General: Bowel sounds are normal. There is no distension.      Palpations: Abdomen is soft.      Tenderness: There is no abdominal tenderness.   Musculoskeletal:         General: No swelling. Normal range of motion.      Cervical back: Normal range of motion and neck supple.   Skin:     General: Skin is warm and dry.   Neurological:      General: No focal deficit present.      Mental Status: He is alert and oriented to person, place, and time.             Significant Labs: All pertinent labs within the past 24 hours have been reviewed.  Recent Lab Results          08/07/23  0643   08/06/23  2108   08/06/23  2105        Albumin 1.9           Alkaline Phosphatase 69                      Anion Gap 9                      Baso # 0.03           Basophil % 0.3           BILIRUBIN TOTAL 0.4  Comment: For infants and newborns, interpretation of results should be based  on gestational age, weight and in agreement with clinical  observations.    Premature Infant recommended reference ranges:  Up to 24 hours.............<8.0 mg/dL  Up to 48 hours............<12.0 mg/dL  3-5 days..................<15.0 mg/dL  6-29 days.................<15.0 mg/dL             Blood Culture, Routine   No Growth to date  [P]   No Growth to date  [P]       BUN 17           Calcium 8.3           Chloride 100           CO2 24           Creatinine 1.0           Differential Method Automated           eGFR >60           Eos # 0.2           Eosinophil % 2.1           Glucose 107           Gran # (ANC) 7.8           Gran % 75.1           Hematocrit 37.1           Hemoglobin 12.5           Immature Grans (Abs) 0.32  Comment: Mild elevation in immature granulocytes is non specific and   can be seen in a variety of conditions including stress response,   acute inflammation, trauma and pregnancy. Correlation with other   laboratory and clinical findings is essential.             Immature Granulocytes 3.1           Lymph # 0.8           Lymph % 7.5           MCH 28.9           MCHC 33.7           MCV 86           Mono # 1.2           Mono % 11.9           MPV 10.6           nRBC 0           Platelets 184           Potassium 3.9           PROTEIN TOTAL 5.5           RBC 4.33           RDW 13.1           Sodium 133           WBC 10.40                    [P] - Preliminary Result               Significant Imaging: I have reviewed all pertinent imaging results/findings within the past 24 hours.

## 2023-08-07 NOTE — PROGRESS NOTES
[facility] Medicine  Progress Note    Patient Name: Shelby Nunez Jr.  MRN: [id number]  Patient Class: OP- Observation   Admission Date: 8/5/2023  Length of Stay: 0 days  Attending Physician: Carl Osborne,*  Primary Care Provider: Kassandra, Primary Doctor        Subjective:     Principal Problem:Acute hypoxemic respiratory failure        HPI:  Mr. Enrique Nichols is a 77 y.o. male patient with a PMHx of a HTN, HLD, prostate cancer s/p prostatectomy who presents to the Emergency Department for evaluation of altered mental status and dizziness which started last night. Patient started feeling unwell on Wednesday night and had a fever aroudn 101 that night.  [facility] he was diagnosed with vertigo and sinus infection, he was started on steroids and amoxicillin.  Last night patients wife stated that he had another fever and was confused and dizzy and fell while coming back from the bathroom, did not hit head or lose consciousness.  He has been taking IBP around the clock for his fevers.  He denies any CP, N/V/D.  In the ED, tmax 103.1, o2 sats 86 on room air, placed on 2 liters.  Lab work notable for WBC 15, , .  UA with no signs of infection.  FLU and COVID negative.  CT head negative for any acute intracranial processes.  CTA chest negative for PE, Showed Moderate size ground-glass and airspace infiltrate left lower lobe.  Patient will be admitted to observation for vertigo, pna, hypoxia.      Code status Full  Surrogate Decision Maker wife Tiffanie      Overview/Hospital Course:  Patient admitted for left lower lobe pneumonia, hypoxia, and vertigo.  He was started on IV antibiotics to include ceftriaxone and azithromycin was added.  LFTs were elevated and hepatitis panel returned negative.  His felt that this might be related to viral etiology and respiratory viral panel ordered.  Upon further review of CT of chest there are areas that appear to be  micro abscesses and significant lymphadenopathy.  At this point the plan will be to continue antibiotics for 10-14 day course and follow up with Pulmonary in 4-6 weeks for repeat CT scan of the chest.    8/7 He cont with high fever . The Blood and sputum  cx NGTD . He has been treated for PNA  with azithromycin and rocephin .  Respiratory panel pending .      Interval History:     Review of Systems   Constitutional:  Positive for appetite change, fatigue and fever.   Neurological:  Positive for dizziness and weakness.     Objective:     Vital Signs (Most Recent):  Temp: 97.5 °F (36.4 °C) (08/07/23 1153)  Pulse: 98 (08/07/23 1153)  Resp: 16 (08/07/23 1153)  BP: 114/68 (08/07/23 1210)  SpO2: (!) 94 % (08/07/23 1153) Vital Signs (24h Range):  Temp:  [97.5 °F (36.4 °C)-101.1 °F (38.4 °C)] 97.5 °F (36.4 °C)  Pulse:  [80-98] 98  Resp:  [16-20] 16  SpO2:  [91 %-95 %] 94 %  BP: ()/(56-73) 114/68     Weight: 103 kg (227 lb 1.2 oz)  Body mass index is 30.8 kg/m².    Intake/Output Summary (Last 24 hours) at 8/7/2023 1434  Last data filed at 8/7/2023 0915  Gross per 24 hour   Intake 918.19 ml   Output 400 ml   Net 518.19 ml         Physical Exam  Constitutional:       Appearance: He is ill-appearing.   HENT:      Nose: Nose normal.      Mouth/Throat:      Mouth: Mucous membranes are moist.      Pharynx: Oropharynx is clear.   Eyes:      Extraocular Movements: Extraocular movements intact.      Conjunctiva/sclera: Conjunctivae normal.   Cardiovascular:      Rate and Rhythm: Normal rate and regular rhythm.      Pulses: Normal pulses.      Heart sounds: Normal heart sounds.   Pulmonary:      Effort: Pulmonary effort is normal.      Comments: Diminished breath sounds all lobes, on 2 liters nasal cannula   Abdominal:      General: Bowel sounds are normal. There is no distension.      Palpations: Abdomen is soft.      Tenderness: There is no abdominal tenderness.   Musculoskeletal:         General: No swelling. Normal range of  motion.      Cervical back: Normal range of motion and neck supple.   Skin:     General: Skin is warm and dry.   Neurological:      General: No focal deficit present.      Mental Status: He is alert and oriented to person, place, and time.             Significant Labs: All pertinent labs within the past 24 hours have been reviewed.  Recent Lab Results         08/07/23  0643   08/06/23  2108   08/06/23  2105        Albumin 1.9           Alkaline Phosphatase 69                      Anion Gap 9                      Baso # 0.03           Basophil % 0.3           BILIRUBIN TOTAL 0.4  Comment: For infants and newborns, interpretation of results should be based  on gestational age, weight and in agreement with clinical  observations.    Premature Infant recommended reference ranges:  Up to 24 hours.............<8.0 mg/dL  Up to 48 hours............<12.0 mg/dL  3-5 days..................<15.0 mg/dL  6-29 days.................<15.0 mg/dL             Blood Culture, Routine   No Growth to date  [P]   No Growth to date  [P]       BUN 17           Calcium 8.3           Chloride 100           CO2 24           Creatinine 1.0           Differential Method Automated           eGFR >60           Eos # 0.2           Eosinophil % 2.1           Glucose 107           Gran # (ANC) 7.8           Gran % 75.1           Hematocrit 37.1           Hemoglobin 12.5           Immature Grans (Abs) 0.32  Comment: Mild elevation in immature granulocytes is non specific and   can be seen in a variety of conditions including stress response,   acute inflammation, trauma and pregnancy. Correlation with other   laboratory and clinical findings is essential.             Immature Granulocytes 3.1           Lymph # 0.8           Lymph % 7.5           MCH 28.9           MCHC 33.7           MCV 86           Mono # 1.2           Mono % 11.9           MPV 10.6           nRBC 0           Platelets 184           Potassium 3.9           PROTEIN TOTAL  5.5           RBC 4.33           RDW 13.1           Sodium 133           WBC 10.40                    [P] - Preliminary Result               Significant Imaging: I have reviewed all pertinent imaging results/findings within the past 24 hours.      Assessment/Plan:      * Acute hypoxemic respiratory failure  Patient with Hypoxic Respiratory failure which is Acute.  he is not on home oxygen. Supplemental oxygen was provided and noted-    Signs/symptoms of respiratory failure include- tachypnea. Contributing diagnoses includes - Pneumonia Labs and images were reviewed. Patient Has recent ABG, which has been reviewed. Will treat underlying causes and adjust management of respiratory failure as follows    Ct chest showed: Moderate size ground-glass and airspace infiltrate left lower lobe  Continue iv abx, added azithromycin  Wean o2 as tolerated  Duo nebs as needed  Check viral respiratory panel  We will need repeat CT scan of the chest in 4-6 weeks to follow up on lymphadenopathy an infiltrate.    Vertigo  Pt/ot eval  meclizine as needed  Fall precautions        Mixed hyperlipidemia  Holding statin d/t elevated ALT/AST      HTN (hypertension)  bp low/normotensive, holding home amlodipine/benazpril at this time    BP  Low normal . Will order 1 lt bolus     Transaminitis  Pt report drink 3 to 4 beer a day  For more than 10 years    hep panel negative   Abd US pending         VTE Risk Mitigation (From admission, onward)         Ordered     enoxaparin injection 40 mg  Daily         08/05/23 0920     IP VTE HIGH RISK PATIENT  Once         08/05/23 0920     Place sequential compression device  Until discontinued         08/05/23 0920                Discharge Planning   DYLAN:      Code Status: Full Code   Is the patient medically ready for discharge?: No    Reason for patient still in hospital (select all that apply): Treatment  Discharge Plan A: Home with family                  Carl Butts MD  Department of  [facility] Medicine   '[facility] Surg

## 2023-08-07 NOTE — ASSESSMENT & PLAN NOTE
Pt report drink 3 to 4 beer a day  For more than 10 years    hep panel negative   Abd US pending

## 2023-08-07 NOTE — PLAN OF CARE
Care plan reviewed with patient. Still on fluids.  NPO for ultrasound. Afebrile this shift. Free from falls. No other complaints made. All orders checked and reviewed.

## 2023-08-07 NOTE — PLAN OF CARE
"[facility] - Med Surg  Discharge Assessment    Primary Care Provider: No, Primary Doctor     Discharge Assessment (most recent)       BRIEF DISCHARGE ASSESSMENT - 08/07/23 1007          Discharge Planning    Assessment Type Discharge Planning Brief Assessment     Resource/Environmental Concerns none     Support Systems Spouse/significant other     Equipment Currently Used at Home none     Current Living Arrangements home     Patient/Family Anticipates Transition to home     Patient/Family Anticipated Services at Transition none     DME Needed Upon Discharge  none     Discharge Plan A Home with family                     Patient will need outpt PT for vestibular therapy.  Discussed with wife and patient, they declined to come to [facility] outpt therapy for services, want treatment in [place] where patient has previously been treated.  CM explained vestibular treatment highly specialized.  Wife responded "that's what the internet is for."  Requests referral.       "

## 2023-08-08 LAB
ALBUMIN SERPL BCP-MCNC: 1.9 G/DL (ref 3.5–5.2)
ALP SERPL-CCNC: 74 U/L (ref 55–135)
ALT SERPL W/O P-5'-P-CCNC: 357 U/L (ref 10–44)
ANION GAP SERPL CALC-SCNC: 7 MMOL/L (ref 8–16)
AST SERPL-CCNC: 261 U/L (ref 10–40)
BACTERIA SPEC AEROBE CULT: NORMAL
BACTERIA SPEC AEROBE CULT: NORMAL
BASOPHILS NFR BLD: 0 % (ref 0–1.9)
BILIRUB SERPL-MCNC: 0.3 MG/DL (ref 0.1–1)
BUN SERPL-MCNC: 13 MG/DL (ref 8–23)
CALCIUM SERPL-MCNC: 8.2 MG/DL (ref 8.7–10.5)
CHLORIDE SERPL-SCNC: 103 MMOL/L (ref 95–110)
CO2 SERPL-SCNC: 25 MMOL/L (ref 23–29)
CREAT SERPL-MCNC: 0.9 MG/DL (ref 0.5–1.4)
DIFFERENTIAL METHOD: ABNORMAL
EOSINOPHIL NFR BLD: 2 % (ref 0–8)
ERYTHROCYTE [DISTWIDTH] IN BLOOD BY AUTOMATED COUNT: 13 % (ref 11.5–14.5)
EST. GFR  (NO RACE VARIABLE): >60 ML/MIN/1.73 M^2
GLUCOSE SERPL-MCNC: 95 MG/DL (ref 70–110)
GRAM STN SPEC: NORMAL
HCT VFR BLD AUTO: 34.9 % (ref 40–54)
HGB BLD-MCNC: 11.9 G/DL (ref 14–18)
IMM GRANULOCYTES # BLD AUTO: ABNORMAL K/UL (ref 0–0.04)
IMM GRANULOCYTES NFR BLD AUTO: ABNORMAL % (ref 0–0.5)
LYMPHOCYTES NFR BLD: 11 % (ref 18–48)
MCH RBC QN AUTO: 29.5 PG (ref 27–31)
MCHC RBC AUTO-ENTMCNC: 34.1 G/DL (ref 32–36)
MCV RBC AUTO: 86 FL (ref 82–98)
METAMYELOCYTES NFR BLD MANUAL: 2 %
MONOCYTES NFR BLD: 10 % (ref 4–15)
MYELOCYTES NFR BLD MANUAL: 3 %
NEUTROPHILS NFR BLD: 60 % (ref 38–73)
NEUTS BAND NFR BLD MANUAL: 12 %
NRBC BLD-RTO: 0 /100 WBC
PLATELET # BLD AUTO: 184 K/UL (ref 150–450)
PMV BLD AUTO: 10.1 FL (ref 9.2–12.9)
POTASSIUM SERPL-SCNC: 3.6 MMOL/L (ref 3.5–5.1)
PROT SERPL-MCNC: 5.4 G/DL (ref 6–8.4)
RBC # BLD AUTO: 4.04 M/UL (ref 4.6–6.2)
SODIUM SERPL-SCNC: 135 MMOL/L (ref 136–145)
WBC # BLD AUTO: 7.77 K/UL (ref 3.9–12.7)

## 2023-08-08 PROCEDURE — 85027 COMPLETE CBC AUTOMATED: CPT | Performed by: NURSE PRACTITIONER

## 2023-08-08 PROCEDURE — 63600175 PHARM REV CODE 636 W HCPCS: Performed by: INTERNAL MEDICINE

## 2023-08-08 PROCEDURE — 94761 N-INVAS EAR/PLS OXIMETRY MLT: CPT

## 2023-08-08 PROCEDURE — 85007 BL SMEAR W/DIFF WBC COUNT: CPT | Performed by: NURSE PRACTITIONER

## 2023-08-08 PROCEDURE — 25000003 PHARM REV CODE 250: Performed by: INTERNAL MEDICINE

## 2023-08-08 PROCEDURE — 97530 THERAPEUTIC ACTIVITIES: CPT | Mod: CQ

## 2023-08-08 PROCEDURE — 36415 COLL VENOUS BLD VENIPUNCTURE: CPT | Performed by: NURSE PRACTITIONER

## 2023-08-08 PROCEDURE — 25000003 PHARM REV CODE 250: Performed by: NURSE PRACTITIONER

## 2023-08-08 PROCEDURE — 27000221 HC OXYGEN, UP TO 24 HOURS

## 2023-08-08 PROCEDURE — 97116 GAIT TRAINING THERAPY: CPT | Mod: CQ

## 2023-08-08 PROCEDURE — 11000001 HC ACUTE MED/SURG PRIVATE ROOM

## 2023-08-08 PROCEDURE — 63600175 PHARM REV CODE 636 W HCPCS: Performed by: NURSE PRACTITIONER

## 2023-08-08 PROCEDURE — 99900035 HC TECH TIME PER 15 MIN (STAT)

## 2023-08-08 PROCEDURE — 94664 DEMO&/EVAL PT USE INHALER: CPT

## 2023-08-08 PROCEDURE — 80053 COMPREHEN METABOLIC PANEL: CPT | Performed by: NURSE PRACTITIONER

## 2023-08-08 RX ADMIN — SODIUM CHLORIDE: 9 INJECTION, SOLUTION INTRAVENOUS at 03:08

## 2023-08-08 RX ADMIN — LEVOFLOXACIN 750 MG: 750 INJECTION, SOLUTION INTRAVENOUS at 03:08

## 2023-08-08 RX ADMIN — ASPIRIN 81 MG CHEWABLE TABLET 81 MG: 81 TABLET CHEWABLE at 08:08

## 2023-08-08 RX ADMIN — ENOXAPARIN SODIUM 40 MG: 40 INJECTION SUBCUTANEOUS at 05:08

## 2023-08-08 RX ADMIN — SODIUM CHLORIDE, POTASSIUM CHLORIDE, SODIUM LACTATE AND CALCIUM CHLORIDE: 600; 310; 30; 20 INJECTION, SOLUTION INTRAVENOUS at 05:08

## 2023-08-08 NOTE — PLAN OF CARE
"Care plan reviewed with patient. Ambulated in the hallway. Verbalized "feeling better."  Afebrile this shift. Free from falls. No other complaints made. All orders checked and reviewed.   "

## 2023-08-08 NOTE — ASSESSMENT & PLAN NOTE
Patient with Hypoxic Respiratory failure which is Acute.  he is not on home oxygen. Supplemental oxygen was provided and noted- Oxygen Concentration (%):  [28] 28  Signs/symptoms of respiratory failure include- tachypnea. Contributing diagnoses includes - Pneumonia Labs and images were reviewed. Patient Has recent ABG, which has been reviewed. Will treat underlying causes and adjust management of respiratory failure as follows    Ct chest showed: Moderate size ground-glass and airspace infiltrate left lower lobe  Change antibiotic to levaquin  Wean o2 as tolerated  Duo nebs as needed  viral respiratory panel negative   We will need repeat CT scan of the chest in 4-6 weeks to follow up on lymphadenopathy an infiltrate.

## 2023-08-08 NOTE — ASSESSMENT & PLAN NOTE
Pt report drink 3 to 4 beer a day  For more than 10 years    hep panel negative   Abd US did not shwo any acute finding except for Suspect right-sided hydronephrosis

## 2023-08-08 NOTE — PLAN OF CARE
AAOx4. Spouse @ bedside. Pt on 2L NC. Pt remained free from fall and injury. No sign of any distress noted. Safety precautions alert. Pt asked to call for assistance if needed. IV access clean dry and intact infusing. Chart checked reviewed. VSS. Plan of care on going.

## 2023-08-08 NOTE — SUBJECTIVE & OBJECTIVE
Interval History:     Review of Systems   Constitutional:  Positive for appetite change and fatigue. Negative for fever.   Neurological:  Positive for weakness. Negative for dizziness.     Objective:     Vital Signs (Most Recent):  Temp: 97.9 °F (36.6 °C) (08/08/23 1127)  Pulse: 99 (08/08/23 1127)  Resp: 20 (08/08/23 1127)  BP: 124/79 (08/08/23 1127)  SpO2: 95 % (08/08/23 1127) Vital Signs (24h Range):  Temp:  [97.7 °F (36.5 °C)-98.3 °F (36.8 °C)] 97.9 °F (36.6 °C)  Pulse:  [81-99] 99  Resp:  [15-20] 20  SpO2:  [92 %-95 %] 95 %  BP: (118-130)/(69-82) 124/79     Weight: 103 kg (227 lb 1.2 oz)  Body mass index is 30.8 kg/m².    Intake/Output Summary (Last 24 hours) at 8/8/2023 1401  Last data filed at 8/8/2023 0844  Gross per 24 hour   Intake 6312.62 ml   Output 300 ml   Net 6012.62 ml         Physical Exam  Constitutional:       Appearance: He is ill-appearing.   HENT:      Nose: Nose normal.      Mouth/Throat:      Mouth: Mucous membranes are moist.      Pharynx: Oropharynx is clear.   Eyes:      Extraocular Movements: Extraocular movements intact.      Conjunctiva/sclera: Conjunctivae normal.   Cardiovascular:      Rate and Rhythm: Normal rate and regular rhythm.      Pulses: Normal pulses.      Heart sounds: Normal heart sounds.   Pulmonary:      Effort: Pulmonary effort is normal.      Comments: Diminished breath sounds all lobes, on 2 liters nasal cannula   Abdominal:      General: Bowel sounds are normal. There is no distension.      Palpations: Abdomen is soft.      Tenderness: There is no abdominal tenderness.   Musculoskeletal:         General: No swelling. Normal range of motion.      Cervical back: Normal range of motion and neck supple.   Skin:     General: Skin is warm and dry.   Neurological:      General: No focal deficit present.      Mental Status: He is alert and oriented to person, place, and time.             Significant Labs: All pertinent labs within the past 24 hours have been reviewed.  Recent  Lab Results         08/08/23  0523        Albumin 1.9       Alkaline Phosphatase 74              Anion Gap 7              Bands 12.0       Basophil % 0.0       BILIRUBIN TOTAL 0.3  Comment: For infants and newborns, interpretation of results should be based  on gestational age, weight and in agreement with clinical  observations.    Premature Infant recommended reference ranges:  Up to 24 hours.............<8.0 mg/dL  Up to 48 hours............<12.0 mg/dL  3-5 days..................<15.0 mg/dL  6-29 days.................<15.0 mg/dL         BUN 13       Calcium 8.2       Chloride 103       CO2 25       Creatinine 0.9       Differential Method Manual       eGFR >60       Eosinophil % 2.0       Glucose 95       Gran % 60.0       Hematocrit 34.9       Hemoglobin 11.9       Immature Grans (Abs) CANCELED  Comment: Mild elevation in immature granulocytes is non specific and   can be seen in a variety of conditions including stress response,   acute inflammation, trauma and pregnancy. Correlation with other   laboratory and clinical findings is essential.    Result canceled by the ancillary.         Immature Granulocytes CANCELED  Comment: Result canceled by the ancillary.       Lymph % 11.0       MCH 29.5       MCHC 34.1       MCV 86       Metamyelocytes 2.0       Mono % 10.0       MPV 10.1       Myelocytes 3.0       nRBC 0       Platelets 184       Potassium 3.6       PROTEIN TOTAL 5.4       RBC 4.04       RDW 13.0       Sodium 135       WBC 7.77               Significant Imaging: I have reviewed all pertinent imaging results/findings within the past 24 hours.

## 2023-08-08 NOTE — PT/OT/SLP PROGRESS
Physical Therapy  Treatment    Shelby Nunez Jr.   MRN: [id number]   Admitting Diagnosis: Acute hypoxemic respiratory failure    PT Received On: 08/08/23  PT Start Time: 0845     PT Stop Time: 0910    PT Total Time (min): 25 min       Billable Minutes:  Gait Training 10 and Therapeutic Activity 15    Treatment Type: Treatment  PT/PTA: PTA     Number of PTA visits since last PT visit: 2       General Precautions: Standard, fall  Orthopedic Precautions: N/A  Braces: N/A  Respiratory Status: Nasal cannula, flow 2 L/min     Spiritual, Cultural Beliefs, Buddhism Practices, Values that Affect Care: yes    Subjective:  Communicated with patient's nurse, Saundra, and completed Epic chart review prior to session.  Patient agreed to PT session.     Pain/Comfort  Pain Rating 1: 0/10  Pain Rating Post-Intervention 1: 0/10    Objective:   Patient found with: peripheral IV, oxygen    Supine > sit EOB: Independent    Forward scoot towards EOB: Independent    STS from EOB > RW: SPV (VC for hand placement)    250ft w/ RW SBA    SpO2 on RA after gait: 90%    Stand pivot T/F to chair w/ RW: SBA    Completed x15 reps AROM TE to BLE: LAQ, Hip Flex, AP   Intermittent cues given as needed to maintain correct form during repetitions     SpO2 on RA after TE: 87%.  Supplemental O2 replaced at 2L and SpO2 increased to 91%.    Educated patient on importance of increased tolerance to upright position and direct impact on CV endurance and strength. Patient encouraged to sit up in chair/ EOB, for a minimum of 2 consecutive hours, 3x per day. Encouraged patient to perform AROM TE to BLE throughout the day within all available planes of motion. Re enforced importance of utilizing call light to meet needs in room and not attempt to get up without staff assistance. Patient verbalized understanding and agreed to comply.      AM-PAC 6 CLICK MOBILITY  How much help from another person does this patient currently need?   1 = Unable, Total/Dependent  Assistance  2 = A lot, Maximum/Moderate Assistance  3 = A little, Minimum/Contact Guard/Supervision  4 = None, Modified [place]/Independent    Turning over in bed (including adjusting bedclothes, sheets and blankets)?: 4  Sitting down on and standing up from a chair with arms (e.g., wheelchair, bedside commode, etc.): 4  Moving from lying on back to sitting on the side of the bed?: 4  Moving to and from a bed to a chair (including a wheelchair)?: 4  Need to walk in hospital room?: 4  Climbing 3-5 steps with a railing?: 1 (NT)  Basic Mobility Total Score: 21    AM-PAC Raw Score CMS G-Code Modifier Level of Impairment Assistance   6 % Total / Unable   7 - 9 CM 80 - 100% Maximal Assist   10 - 14 CL 60 - 80% Moderate Assist   15 - 19 CK 40 - 60% Moderate Assist   20 - 22 CJ 20 - 40% Minimal Assist   23 CI 1-20% SBA / CGA   24 CH 0% Independent/ Mod I     Patient left up in chair with call button in reach and wife present.    Assessment:  Shelby Nunez Jr. is a 77 y.o. male with a medical diagnosis of Acute hypoxemic respiratory failure and presents with overall decline in functional mobility. Patient would continue to benefit from skilled PT to address functional limitations listed below in order to return to PLOF/decrease caregiver burden. Patient able to progress with functional mobility, transfers and gait. At this time, patient has met goals for bed mobility, gait and AMPAC score. Plan to speak w/ SPV PT RE: goal updates.    Rehab identified problem list/impairments: weakness, impaired self care skills, impaired functional mobility, gait instability, impaired balance, decreased safety awareness, impaired cardiopulmonary response to activity    Rehab potential is good.    Activity tolerance: Good    Discharge recommendations: outpatient PT      Barriers to discharge:      Equipment recommendations: walker, rolling     GOALS:   Multidisciplinary Problems       Physical Therapy Goals          Problem:  Physical Therapy    Goal Priority Disciplines Outcome Goal Variances Interventions   Physical Therapy Goal     PT, PT/OT      Description: LTG'S TO BE MET IN 14 DAYS (8/5/23)  PT WILL REQUIRE mod i FOR BED MOBILITY  PT WILL REQUIRE mod i FOR BED<>CHAIR TF'S  PT WILL  ft with RW with SBA for safety  PT WILL INC AMPAC SCORE BY 2 POINTS TO PROGRESS GROSS FUNC MOBILITY                         PLAN:    Patient to be seen 3 x/week to address the above listed problems via gait training, therapeutic activities, therapeutic exercises  Plan of Care expires: 08/19/23  Plan of Care reviewed with: patient, spouse         08/08/2023

## 2023-08-09 VITALS
RESPIRATION RATE: 18 BRPM | OXYGEN SATURATION: 92 % | WEIGHT: 227.06 LBS | BODY MASS INDEX: 30.75 KG/M2 | TEMPERATURE: 98 F | DIASTOLIC BLOOD PRESSURE: 93 MMHG | SYSTOLIC BLOOD PRESSURE: 138 MMHG | HEART RATE: 122 BPM | HEIGHT: 72 IN

## 2023-08-09 LAB
ALBUMIN SERPL BCP-MCNC: 2.2 G/DL (ref 3.5–5.2)
ALP SERPL-CCNC: 89 U/L (ref 55–135)
ALT SERPL W/O P-5'-P-CCNC: 329 U/L (ref 10–44)
ANION GAP SERPL CALC-SCNC: 11 MMOL/L (ref 8–16)
AST SERPL-CCNC: 165 U/L (ref 10–40)
BASOPHILS NFR BLD: 0 % (ref 0–1.9)
BILIRUB SERPL-MCNC: 0.4 MG/DL (ref 0.1–1)
BUN SERPL-MCNC: 12 MG/DL (ref 8–23)
CALCIUM SERPL-MCNC: 8.8 MG/DL (ref 8.7–10.5)
CHLORIDE SERPL-SCNC: 104 MMOL/L (ref 95–110)
CO2 SERPL-SCNC: 23 MMOL/L (ref 23–29)
CREAT SERPL-MCNC: 0.8 MG/DL (ref 0.5–1.4)
DIFFERENTIAL METHOD: ABNORMAL
EOSINOPHIL NFR BLD: 5 % (ref 0–8)
ERYTHROCYTE [DISTWIDTH] IN BLOOD BY AUTOMATED COUNT: 13 % (ref 11.5–14.5)
EST. GFR  (NO RACE VARIABLE): >60 ML/MIN/1.73 M^2
GLUCOSE SERPL-MCNC: 99 MG/DL (ref 70–110)
HCT VFR BLD AUTO: 38.7 % (ref 40–54)
HGB BLD-MCNC: 13.2 G/DL (ref 14–18)
IMM GRANULOCYTES # BLD AUTO: ABNORMAL K/UL (ref 0–0.04)
IMM GRANULOCYTES NFR BLD AUTO: ABNORMAL % (ref 0–0.5)
L PNEUMO AG UR QL IA: NEGATIVE
LYMPHOCYTES NFR BLD: 17 % (ref 18–48)
MCH RBC QN AUTO: 29.3 PG (ref 27–31)
MCHC RBC AUTO-ENTMCNC: 34.1 G/DL (ref 32–36)
MCV RBC AUTO: 86 FL (ref 82–98)
METAMYELOCYTES NFR BLD MANUAL: 1 %
MONOCYTES NFR BLD: 7 % (ref 4–15)
NEUTROPHILS NFR BLD: 68 % (ref 38–73)
NEUTS BAND NFR BLD MANUAL: 2 %
NRBC BLD-RTO: 0 /100 WBC
PLATELET # BLD AUTO: 215 K/UL (ref 150–450)
PMV BLD AUTO: 9.5 FL (ref 9.2–12.9)
POTASSIUM SERPL-SCNC: 3.7 MMOL/L (ref 3.5–5.1)
PROT SERPL-MCNC: 6 G/DL (ref 6–8.4)
RBC # BLD AUTO: 4.51 M/UL (ref 4.6–6.2)
SODIUM SERPL-SCNC: 138 MMOL/L (ref 136–145)
WBC # BLD AUTO: 8.1 K/UL (ref 3.9–12.7)

## 2023-08-09 PROCEDURE — 94618 PULMONARY STRESS TESTING: CPT

## 2023-08-09 PROCEDURE — 85007 BL SMEAR W/DIFF WBC COUNT: CPT | Performed by: INTERNAL MEDICINE

## 2023-08-09 PROCEDURE — 80053 COMPREHEN METABOLIC PANEL: CPT | Performed by: INTERNAL MEDICINE

## 2023-08-09 PROCEDURE — 25000003 PHARM REV CODE 250: Performed by: INTERNAL MEDICINE

## 2023-08-09 PROCEDURE — 25000003 PHARM REV CODE 250: Performed by: NURSE PRACTITIONER

## 2023-08-09 PROCEDURE — 27000646 HC AEROBIKA DEVICE

## 2023-08-09 PROCEDURE — 85027 COMPLETE CBC AUTOMATED: CPT | Performed by: INTERNAL MEDICINE

## 2023-08-09 PROCEDURE — 94664 DEMO&/EVAL PT USE INHALER: CPT

## 2023-08-09 PROCEDURE — 36415 COLL VENOUS BLD VENIPUNCTURE: CPT | Performed by: INTERNAL MEDICINE

## 2023-08-09 PROCEDURE — 25000242 PHARM REV CODE 250 ALT 637 W/ HCPCS: Performed by: INTERNAL MEDICINE

## 2023-08-09 PROCEDURE — 94761 N-INVAS EAR/PLS OXIMETRY MLT: CPT

## 2023-08-09 PROCEDURE — 99900035 HC TECH TIME PER 15 MIN (STAT)

## 2023-08-09 RX ORDER — LEVOFLOXACIN 750 MG/1
750 TABLET ORAL DAILY
Qty: 7 TABLET | Refills: 0 | Status: SHIPPED | OUTPATIENT
Start: 2023-08-09 | End: 2023-08-16

## 2023-08-09 RX ADMIN — GUAIFENESIN 600 MG: 600 TABLET, EXTENDED RELEASE ORAL at 08:08

## 2023-08-09 RX ADMIN — FLUTICASONE PROPIONATE 100 MCG: 50 SPRAY, METERED NASAL at 08:08

## 2023-08-09 RX ADMIN — ASPIRIN 81 MG CHEWABLE TABLET 81 MG: 81 TABLET CHEWABLE at 08:08

## 2023-08-09 NOTE — PT/OT/SLP PROGRESS
Physical Therapy      Patient Name:  Shelby Nunez Jr.   MRN:  [id number]    1046 P.T. COMPLETE CHART REVIEW. PT MET IN RM HOWEVER CURRENTLY BEING D/C PER PT AND WIFE. PT LEFT SUP IN BED WITH ALL NEEDS MET AS PT DECLINED P.T. TX. Mckenna Kincaid, PT ,8/9/2023

## 2023-08-09 NOTE — PLAN OF CARE
Discussed poc with pt, pt verbalized understanding    Purposeful rounding every 2hours    VS wnl  Fall precautions in place, remains injury free    Accurate I&Os  Bed locked at lowest position  Call light within reach    Chart check complete

## 2023-08-09 NOTE — ASSESSMENT & PLAN NOTE
Patient with Hypoxic Respiratory failure which is Acute.  he is not on home oxygen. Supplemental oxygen was provided and noted-    Signs/symptoms of respiratory failure include- tachypnea. Contributing diagnoses includes - Pneumonia Labs and images were reviewed. Patient Has recent ABG, which has been reviewed. Will treat underlying causes and adjust management of respiratory failure as follows    Ct chest showed: Moderate size ground-glass and airspace infiltrate left lower lobe  Change antibiotic to levaquin  Wean o2 as tolerated  Duo nebs as needed  viral respiratory panel negative   We will need repeat CT scan of the chest in 4-6 weeks to follow up on lymphadenopathy an infiltrate.    No qualify for home o2

## 2023-08-09 NOTE — PLAN OF CARE
[facility] - Med Surg  Discharge Final Note    Primary Care Provider: Kassandra Primary Doctor    Expected Discharge Date: 8/9/2023    Final Discharge Note (most recent)       Final Note - 08/09/23 1110          Final Note    Assessment Type Final Discharge Note     Anticipated Discharge Disposition Home or Self Care     Hospital Resources/Appts/Education Provided Patient refused appointment set-up                     Important Message from Medicare             Contact Info       No, Primary Doctor   Relationship: PCP - General        Next Steps: Follow up in 1 week(s)          DC Dispo: home with wife  PCP f/u: CM offered to schedule PCP f/u. Patient's wife states she will schedule f/u with PCP and schedule appt.

## 2023-08-09 NOTE — DISCHARGE SUMMARY
[facility]  Discharge Summary      Patient Name: Shelby Nunez Jr.  MRN: [id number]  KATHARINA: [id number]  Patient Class: IP- Inpatient  Admission Date: 8/5/2023  Hospital Length of Stay: 2 days  Discharge Date and Time:  08/09/2023 11:14 AM  Attending Physician: Carl Osborne,*   Discharging Provider: Carl Butts MD  Primary Care Provider: Kassandra, Primary Doctor    Primary Care Team: Networked reference to record PCT     HPI:   Mr. Enrique Nichols is a 77 y.o. male patient with a PMHx of a HTN, HLD, prostate cancer s/p prostatectomy who presents to the Emergency Department for evaluation of altered mental status and dizziness which started last night. Patient started feeling unwell on Wednesday night and had a fever aroudn 101 that night.  Thursday [facility] where he was diagnosed with vertigo and sinus infection, he was started on steroids and amoxicillin.  Last night patients wife stated that he had another fever and was confused and dizzy and fell while coming back from the bathroom, did not hit head or lose consciousness.  He has been taking IBP around the clock for his fevers.  He denies any CP, N/V/D.  In the ED, tmax 103.1, o2 sats 86 on room air, placed on 2 liters.  Lab work notable for WBC 15, , .  UA with no signs of infection.  FLU and COVID negative.  CT head negative for any acute intracranial processes.  CTA chest negative for PE, Showed Moderate size ground-glass and airspace infiltrate left lower lobe.  Patient will be admitted to observation for vertigo, pna, hypoxia.      Code status Full  Surrogate Decision Maker wife Tiffanie      * No surgery found *      Hospital Course:   Patient admitted for left lower lobe pneumonia, hypoxia, and vertigo.  He was started on IV antibiotics to include ceftriaxone and azithromycin was added.  LFTs were elevated and hepatitis panel returned negative.  His felt that this might be related to  viral etiology and respiratory viral panel ordered.  Upon further review of CT of chest there are areas that appear to be micro abscesses and significant lymphadenopathy.  At this point the plan will be to continue antibiotics for 10-14 day course and follow up with Pulmonary in 4-6 weeks for repeat CT scan of the chest.    8/7 He cont with high fever . The Blood and sputum  cx NGTD . He has been treated for PNA  with azithromycin and rocephin .  Respiratory panel pending .  8/8 Pt report feeeling better after 1 lt bolus yesterday . The Bp has improved . The RUQ US did not show any acute  liver or gallbladder problems . Suspect right-sided hydronephrosis . He has been afebrile for 24 hrs .   8/9 76 y/o WM admitted with a dx of  CAP . Started initially on rocephin /azithromycin  and subsequently  changed to Levaquin  due to refractroy fever . Pt has bene afebrile since change to levaquin . The blood and sputum cx NGTD . Respiratory panel , COVID and flue  were Negative . The LFU are mild elevated most likely due to ETOH abuse . The retroperitoneal us show mild right side hydronephrosis . He has a good uop with normal kidney function . He voice has a urology and will f/u him for this problem . Pt dizziness and orthostatic hypotension  improved  with aggressive IVF . Pt was seen and examined at bedside . He was determined to be suitable for d./c      Pt did nto qualify for home  With a o2 sat > 90 at rest and exertion     Goals of Care Treatment Preferences:  Code Status: Full Code      Consults:     Pulmonary  * Acute hypoxemic respiratory failure  Patient with Hypoxic Respiratory failure which is Acute.  he is not on home oxygen. Supplemental oxygen was provided and noted-    Signs/symptoms of respiratory failure include- tachypnea. Contributing diagnoses includes - Pneumonia Labs and images were reviewed. Patient Has recent ABG, which has been reviewed. Will treat underlying causes and adjust management of  respiratory failure as follows    Ct chest showed: Moderate size ground-glass and airspace infiltrate left lower lobe  Change antibiotic to levaquin  Wean o2 as tolerated  Duo nebs as needed  viral respiratory panel negative   We will need repeat CT scan of the chest in 4-6 weeks to follow up on lymphadenopathy an infiltrate.    No qualify for home o2     Cardiac/Vascular  Mixed hyperlipidemia  Holding statin d/t elevated ALT/AST      HTN (hypertension)  bp low/normotensive, holding home amlodipine/benazpril at this time    BP  Low normal . Will order 1 lt bolus     GI  Transaminitis  Pt report drink 3 to 4 beer a day  For more than 10 years    hep panel negative   Abd US did not shwo any acute finding except for Suspect right-sided hydronephrosis      Other  Vertigo  Pt/ot eval  meclizine as needed  Fall precautions          Final Active Diagnoses:    Diagnosis Date Noted POA    PRINCIPAL PROBLEM:  Acute hypoxemic respiratory failure [J96.01] 08/05/2023 Yes    Transaminitis [R74.01] 08/05/2023 Yes    HTN (hypertension) [I10] 08/05/2023 Yes    Mixed hyperlipidemia [E78.2] 08/05/2023 Yes    Vertigo [R42] 08/05/2023 Yes      Problems Resolved During this Admission:       Discharged Condition: stable    Disposition: Home or Self Care    Follow Up:   Follow-up Information     No, Primary Doctor Follow up in 1 week(s).                     Patient Instructions:      Ambulatory referral/consult to Pulmonology   Standing Status: Future   Referral Priority: Routine Referral Type: Consultation   Referral Reason: Specialty Services Required   Requested Specialty: Pulmonary Disease   Number of Visits Requested: 1     Ambulatory referral/consult to [facility]   Standing Status: Future   Referral Priority: Routine Referral Type: Consultation   Referral Reason: Specialty Services Required   Number of Visits Requested: 1     Diet Cardiac     Activity as tolerated       Significant Diagnostic Studies: Labs:    BMP:   Recent Labs   Lab 08/08/23  0523 08/09/23  0745   GLU 95 99   * 138   K 3.6 3.7    104   CO2 25 23   BUN 13 12   CREATININE 0.9 0.8   CALCIUM 8.2* 8.8   , CMP   Recent Labs   Lab 08/08/23 0523 08/09/23  0745   * 138   K 3.6 3.7    104   CO2 25 23   GLU 95 99   BUN 13 12   CREATININE 0.9 0.8   CALCIUM 8.2* 8.8   PROT 5.4* 6.0   ALBUMIN 1.9* 2.2*   BILITOT 0.3 0.4   ALKPHOS 74 89   * 165*   * 329*   ANIONGAP 7* 11    and CBC   Recent Labs   Lab 08/08/23 0523 08/09/23 0745   WBC 7.77 8.10   HGB 11.9* 13.2*   HCT 34.9* 38.7*    215     Microbiology:   Blood Culture   Lab Results   Component Value Date    LABBLOO No Growth to date 08/06/2023    LABBLOO No Growth to date 08/06/2023    LABBLOO No Growth to date 08/06/2023       Pending Diagnostic Studies:     Procedure Component Value Units Date/Time    Legionella antigen, urine [[id number]] Collected: 08/06/23 1821    Order Status: Sent Lab Status: In process Updated: 08/07/23 0204    Specimen: Urine, Clean Catch          Medications:  Reconciled Home Medications:      Medication List      START taking these medications    levoFLOXacin 750 MG tablet  Commonly known as: LEVAQUIN  Take 1 tablet (750 mg total) by mouth once daily. for 7 days        CONTINUE taking these medications    ALAHIST PE 2-7.5 mg Tab  Generic drug: dexbrompheniramine-phenyleph  Take 1 tablet by mouth 3 (three) times daily.     amlodipine-benazepril 10-20mg 10-20 mg per capsule  Commonly known as: LOTREL  Take 1 capsule by mouth once daily.     aspirin 81 MG Chew  Take 81 mg by mouth once daily.     rosuvastatin 5 MG tablet  Commonly known as: CRESTOR  Take 5 mg by mouth once daily.        STOP taking these medications    amoxicillin 875 MG tablet  Commonly known as: AMOXIL     predniSONE 20 MG tablet  Commonly known as: DELTASONE            Indwelling Lines/Drains at time of discharge:   Lines/Drains/Airways     None                 Time spent  on the discharge of patient: 30 minutes         Carl Butts MD  Department of Hospital Medicine  [facility]

## 2023-08-09 NOTE — PROGRESS NOTES
[facility] Medicine  Progress Note    Patient Name: Shelby Nunez Jr.  MRN: [id number]  Patient Class: IP- Inpatient   Admission Date: 8/5/2023  Length of Stay: 2 days  Attending Physician: Carl Osborne,*  Primary Care Provider: Kassandra, Primary Doctor        Subjective:     Principal Problem:Acute hypoxemic respiratory failure        HPI:  Mr. Enrique Nichols is a 77 y.o. male patient with a PMHx of a HTN, HLD, prostate cancer s/p prostatectomy who presents to the Emergency Department for evaluation of altered mental status and dizziness which started last night. Patient started feeling unwell on Wednesday night and had a fever aroudn 101 that night.  Thursday [facility] where he was diagnosed with vertigo and sinus infection, he was started on steroids and amoxicillin.  Last night patients wife stated that he had another fever and was confused and dizzy and fell while coming back from the bathroom, did not hit head or lose consciousness.  He has been taking IBP around the clock for his fevers.  He denies any CP, N/V/D.  In the ED, tmax 103.1, o2 sats 86 on room air, placed on 2 liters.  Lab work notable for WBC 15, , .  UA with no signs of infection.  FLU and COVID negative.  CT head negative for any acute intracranial processes.  CTA chest negative for PE, Showed Moderate size ground-glass and airspace infiltrate left lower lobe.  Patient will be admitted to observation for vertigo, pna, hypoxia.      Code status Full  Surrogate Decision Maker wife Tiffanie      Overview/Hospital Course:  Patient admitted for left lower lobe pneumonia, hypoxia, and vertigo.  He was started on IV antibiotics to include ceftriaxone and azithromycin was added.  LFTs were elevated and hepatitis panel returned negative.  His felt that this might be related to viral etiology and respiratory viral panel ordered.  Upon further review of CT of chest there are areas that appear to be  micro abscesses and significant lymphadenopathy.  At this point the plan will be to continue antibiotics for 10-14 day course and follow up with Pulmonary in 4-6 weeks for repeat CT scan of the chest.    8/7 He cont with high fever . The Blood and sputum  cx NGTD . He has been treated for PNA  with azithromycin and rocephin .  Respiratory panel pending .  8/8 Pt report feeeling better after 1 lt bolus yesterday . The Bp has improved . The RUQ US did not show any acute  liver or gallbladder problems . Suspect right-sided hydronephrosis . He has been afebrile for 24 hrs .         Interval History:     Review of Systems   Constitutional:  Positive for appetite change and fatigue. Negative for fever.   Neurological:  Positive for weakness. Negative for dizziness.     Objective:     Vital Signs (Most Recent):  Temp: 97.9 °F (36.6 °C) (08/08/23 1127)  Pulse: 99 (08/08/23 1127)  Resp: 20 (08/08/23 1127)  BP: 124/79 (08/08/23 1127)  SpO2: 95 % (08/08/23 1127) Vital Signs (24h Range):  Temp:  [97.7 °F (36.5 °C)-98.3 °F (36.8 °C)] 97.9 °F (36.6 °C)  Pulse:  [81-99] 99  Resp:  [15-20] 20  SpO2:  [92 %-95 %] 95 %  BP: (118-130)/(69-82) 124/79     Weight: 103 kg (227 lb 1.2 oz)  Body mass index is 30.8 kg/m².    Intake/Output Summary (Last 24 hours) at 8/8/2023 1401  Last data filed at 8/8/2023 0844  Gross per 24 hour   Intake 6312.62 ml   Output 300 ml   Net 6012.62 ml         Physical Exam  Constitutional:       Appearance: He is ill-appearing.   HENT:      Nose: Nose normal.      Mouth/Throat:      Mouth: Mucous membranes are moist.      Pharynx: Oropharynx is clear.   Eyes:      Extraocular Movements: Extraocular movements intact.      Conjunctiva/sclera: Conjunctivae normal.   Cardiovascular:      Rate and Rhythm: Normal rate and regular rhythm.      Pulses: Normal pulses.      Heart sounds: Normal heart sounds.   Pulmonary:      Effort: Pulmonary effort is normal.      Comments: Diminished breath sounds all lobes, on 2  liters nasal cannula   Abdominal:      General: Bowel sounds are normal. There is no distension.      Palpations: Abdomen is soft.      Tenderness: There is no abdominal tenderness.   Musculoskeletal:         General: No swelling. Normal range of motion.      Cervical back: Normal range of motion and neck supple.   Skin:     General: Skin is warm and dry.   Neurological:      General: No focal deficit present.      Mental Status: He is alert and oriented to person, place, and time.             Significant Labs: All pertinent labs within the past 24 hours have been reviewed.  Recent Lab Results         08/08/23  0523        Albumin 1.9       Alkaline Phosphatase 74              Anion Gap 7              Bands 12.0       Basophil % 0.0       BILIRUBIN TOTAL 0.3  Comment: For infants and newborns, interpretation of results should be based  on gestational age, weight and in agreement with clinical  observations.    Premature Infant recommended reference ranges:  Up to 24 hours.............<8.0 mg/dL  Up to 48 hours............<12.0 mg/dL  3-5 days..................<15.0 mg/dL  6-29 days.................<15.0 mg/dL         BUN 13       Calcium 8.2       Chloride 103       CO2 25       Creatinine 0.9       Differential Method Manual       eGFR >60       Eosinophil % 2.0       Glucose 95       Gran % 60.0       Hematocrit 34.9       Hemoglobin 11.9       Immature Grans (Abs) CANCELED  Comment: Mild elevation in immature granulocytes is non specific and   can be seen in a variety of conditions including stress response,   acute inflammation, trauma and pregnancy. Correlation with other   laboratory and clinical findings is essential.    Result canceled by the ancillary.         Immature Granulocytes CANCELED  Comment: Result canceled by the ancillary.       Lymph % 11.0       MCH 29.5       MCHC 34.1       MCV 86       Metamyelocytes 2.0       Mono % 10.0       MPV 10.1       Myelocytes 3.0       nRBC 0        Platelets 184       Potassium 3.6       PROTEIN TOTAL 5.4       RBC 4.04       RDW 13.0       Sodium 135       WBC 7.77               Significant Imaging: I have reviewed all pertinent imaging results/findings within the past 24 hours.      Assessment/Plan:      * Acute hypoxemic respiratory failure  Patient with Hypoxic Respiratory failure which is Acute.  he is not on home oxygen. Supplemental oxygen was provided and noted- Oxygen Concentration (%):  [28] 28  Signs/symptoms of respiratory failure include- tachypnea. Contributing diagnoses includes - Pneumonia Labs and images were reviewed. Patient Has recent ABG, which has been reviewed. Will treat underlying causes and adjust management of respiratory failure as follows    Ct chest showed: Moderate size ground-glass and airspace infiltrate left lower lobe  Change antibiotic to levaquin  Wean o2 as tolerated  Duo nebs as needed  viral respiratory panel negative   We will need repeat CT scan of the chest in 4-6 weeks to follow up on lymphadenopathy an infiltrate.    Vertigo  Pt/ot eval  meclizine as needed  Fall precautions        Mixed hyperlipidemia  Holding statin d/t elevated ALT/AST      HTN (hypertension)  bp low/normotensive, holding home amlodipine/benazpril at this time    BP  Low normal . Will order 1 lt bolus     Transaminitis  Pt report drink 3 to 4 beer a day  For more than 10 years    hep panel negative   Abd US did not shwo any acute finding except for Suspect right-sided hydronephrosis        VTE Risk Mitigation (From admission, onward)         Ordered     enoxaparin injection 40 mg  Daily         08/05/23 0920     IP VTE HIGH RISK PATIENT  Once         08/05/23 0920     Place sequential compression device  Until discontinued         08/05/23 0920                Discharge Planning   DYLAN: 8/9/2023     Code Status: Full Code   Is the patient medically ready for discharge?: No    Reason for patient still in hospital (select all that apply):  Treatment  Discharge Plan A: Home with family                  Carl Daija Butts MD  Department of Hospital Medicine   [facility]

## 2023-08-09 NOTE — RESPIRATORY THERAPY
Home Oxygen Evaluation - [facility] - Cardiopulmonary Department      Date Performed: 8/9/2023      1) Patient's Home O2 Sat on room air, while at rest: Room Air SpO2 At Rest: 92 %        If O2 sats on room air at rest are 88% or below, patient qualifies.  Document O2 liter flow needed in Step 2.  If O2 sats are 89% or above, complete Step 3.        2)  If patient is not ambulated and O2 sats are <88%, what is the O2 liter flow required to meet ordered saturation? Home O2 Eval Comments: no home O2    If O2 sats on room air while exercising remain 89% or above patient does not qualify, no further testing needed Document N/A in step 3. If O2 sats on room air while exercising are 88% or below, continue to Step 4.    3) Patient's O2 Sat on room air while exercising: Room Air SpO2 During Ambulation: 90 %        4) Patient's O2 Sat while exercising on O2:   at           (Must show improvement from #4 for patients to qualify)

## 2023-08-09 NOTE — PLAN OF CARE
Problem: Adult Inpatient Plan of Care  Goal: Plan of Care Review  Outcome: Ongoing, Progressing  Flowsheets (Taken 8/9/2023 0515)  Plan of Care Reviewed With:   patient   spouse     Problem: Fever  Goal: Body Temperature in Desired Range  Outcome: Ongoing, Progressing  Intervention: Promote Normothermia  Flowsheets (Taken 8/9/2023 0515)  Fever Reduction/Comfort Measures: lightweight bedding

## 2023-08-09 NOTE — NURSING
Discharge instructions, written and verbal given to patient. Teach back complete. Medications delivered by pharmacy. Stable for discharge.

## 2023-08-10 ENCOUNTER — PES CALL (OUTPATIENT)
Dept: ADMINISTRATIVE | Facility: CLINIC | Age: 78
End: 2023-08-10
Payer: MEDICARE

## 2023-08-10 LAB
BACTERIA BLD CULT: NORMAL
BACTERIA BLD CULT: NORMAL

## 2023-08-11 NOTE — PHYSICIAN QUERY
PT Name: Shelby Nunez Jr.  MR #: [id number]     Documentation Clarification      CDS: Jerrod Todd RN CCDS               Contact information: [email or web address]      This form is a permanent document in the medical record.     Query Date: August 11, 2023    By submitting this query, we are merely seeking further clarification of documentation. Please utilize your independent clinical judgment when addressing the question(s) below.    The Medical Record reflects the following:    Supporting Clinical Findings Location in Medical Record    08/05/23 06:05   POC PH 7.540 (H)   POC PCO2 23.7 (LL)   POC PO2 51 (LL)   POC HCO3 20.3 (L)   POC SATURATED O2 91 (L)   Sample ARTERIAL   POC BE -2   DelSys Room Air   Site RR    ABGs   pH elevated, possible over ventilation due to fever and hypoxia.  8/5/2023 ED provider notes   In the ED, tmax 103.1, o2 sats 86 on room air    Acute hypoxemic respiratory failure  Signs/symptoms of respiratory failure include- tachypnea. Contributing diagnoses includes - Pneumonia   Continue iv abx  Wean o2 as tolerated  Duo nebs as needed 8/5/2023 H&P   Supplemental O2 @ 4-5L 8/5/2023 Vitals                                                                            Provider, please provide diagnosis or diagnoses associated with above clinical findings.    [  x ] Respiratory Alkalosis   [   ] Other (please specify): ____________   [  ] Clinically undetermined

## 2023-08-12 LAB
BACTERIA BLD CULT: NORMAL
BACTERIA BLD CULT: NORMAL

## 2023-11-06 PROBLEM — J96.01 ACUTE HYPOXEMIC RESPIRATORY FAILURE: Status: RESOLVED | Noted: 2023-08-05 | Resolved: 2023-11-06

## 2024-11-29 NOTE — SUBJECTIVE & OBJECTIVE
Appt confirmed for Monday    History reviewed. No pertinent past medical history.    Past Surgical History:   Procedure Laterality Date    PROSTATECTOMY  2019       Review of patient's allergies indicates:  No Known Allergies    No current facility-administered medications on file prior to encounter.     Current Outpatient Medications on File Prior to Encounter   Medication Sig    ALAHIST PE 2-7.5 mg Tab Take 1 tablet by mouth 3 (three) times daily.    amlodipine-benazepril 10-20mg (LOTREL) 10-20 mg per capsule Take 1 capsule by mouth.    amoxicillin (AMOXIL) 875 MG tablet Take 875 mg by mouth every 12 (twelve) hours.    aspirin 81 MG Chew Take 81 mg by mouth once daily.    predniSONE (DELTASONE) 20 MG tablet Take 40 mg by mouth.    rosuvastatin (CRESTOR) 5 MG tablet Take 5 mg by mouth.     Family History    None       Tobacco Use    Smoking status: Former     Current packs/day: 0.00     Types: Cigarettes    Smokeless tobacco: Never   Substance and Sexual Activity    Alcohol use: Not Currently     Alcohol/week: 4.0 standard drinks of alcohol     Types: 4 Cans of beer per week    Drug use: Never    Sexual activity: Not on file     Review of Systems   Constitutional:  Positive for appetite change, fatigue and fever.   Neurological:  Positive for dizziness and weakness.   Psychiatric/Behavioral:  Positive for confusion.      Objective:     Vital Signs (Most Recent):  Temp: 100 °F (37.8 °C) (08/05/23 0909)  Pulse: 79 (08/05/23 0909)  Resp: (!) 22 (08/05/23 0909)  BP: (!) 122/54 (08/05/23 0909)  SpO2: 95 % (08/05/23 0909) Vital Signs (24h Range):  Temp:  [99.5 °F (37.5 °C)-103.1 °F (39.5 °C)] 100 °F (37.8 °C)  Pulse:  [79-95] 79  Resp:  [20-29] 22  SpO2:  [92 %-96 %] 95 %  BP: (122-159)/() 122/54     Weight: 103 kg (227 lb 1.2 oz)  Body mass index is 30.8 kg/m².     Physical Exam  Constitutional:       Appearance: He is ill-appearing.   Cardiovascular:      Rate and Rhythm: Normal rate and regular rhythm.      Pulses: Normal pulses.      Heart  sounds: Normal heart sounds.   Pulmonary:      Effort: Pulmonary effort is normal.      Comments: Diminished breath sounds all lobes, on 2 liters nasal cannula   Abdominal:      General: Bowel sounds are normal. There is no distension.      Palpations: Abdomen is soft.      Tenderness: There is no abdominal tenderness.   Musculoskeletal:         General: No swelling. Normal range of motion.   Skin:     General: Skin is warm and dry.   Neurological:      General: No focal deficit present.      Mental Status: He is alert and oriented to person, place, and time.        Significant Labs: All pertinent labs within the past 24 hours have been reviewed.    Significant Imaging: I have reviewed all pertinent imaging results/findings within the past 24 hours.